# Patient Record
Sex: MALE | Race: WHITE
[De-identification: names, ages, dates, MRNs, and addresses within clinical notes are randomized per-mention and may not be internally consistent; named-entity substitution may affect disease eponyms.]

---

## 2017-05-09 ENCOUNTER — APPOINTMENT (OUTPATIENT)
Dept: PULMONOLOGY | Facility: CLINIC | Age: 67
End: 2017-05-09

## 2017-05-09 VITALS
HEIGHT: 70 IN | BODY MASS INDEX: 35.07 KG/M2 | SYSTOLIC BLOOD PRESSURE: 128 MMHG | HEART RATE: 73 BPM | WEIGHT: 245 LBS | DIASTOLIC BLOOD PRESSURE: 80 MMHG | OXYGEN SATURATION: 96 %

## 2017-05-09 DIAGNOSIS — Z87.891 PERSONAL HISTORY OF NICOTINE DEPENDENCE: ICD-10-CM

## 2017-05-09 DIAGNOSIS — Z87.19 PERSONAL HISTORY OF OTHER DISEASES OF THE DIGESTIVE SYSTEM: ICD-10-CM

## 2017-05-09 DIAGNOSIS — Z78.9 OTHER SPECIFIED HEALTH STATUS: ICD-10-CM

## 2017-05-09 DIAGNOSIS — Z86.39 PERSONAL HISTORY OF OTHER ENDOCRINE, NUTRITIONAL AND METABOLIC DISEASE: ICD-10-CM

## 2017-05-09 DIAGNOSIS — E11.9 TYPE 2 DIABETES MELLITUS W/OUT COMPLICATIONS: ICD-10-CM

## 2017-05-09 DIAGNOSIS — Z82.49 FAMILY HISTORY OF ISCHEMIC HEART DISEASE AND OTHER DISEASES OF THE CIRCULATORY SYSTEM: ICD-10-CM

## 2017-05-09 DIAGNOSIS — Z80.3 FAMILY HISTORY OF MALIGNANT NEOPLASM OF BREAST: ICD-10-CM

## 2017-05-09 DIAGNOSIS — Z91.89 OTHER SPECIFIED PERSONAL RISK FACTORS, NOT ELSEWHERE CLASSIFIED: ICD-10-CM

## 2017-05-09 RX ORDER — ATORVASTATIN CALCIUM 10 MG/1
10 TABLET, FILM COATED ORAL
Refills: 0 | Status: ACTIVE | COMMUNITY

## 2017-05-09 RX ORDER — METFORMIN HYDROCHLORIDE 500 MG/1
500 TABLET, COATED ORAL
Refills: 0 | Status: ACTIVE | COMMUNITY

## 2017-05-09 RX ORDER — GLIPIZIDE 5 MG/1
5 TABLET, FILM COATED, EXTENDED RELEASE ORAL
Refills: 0 | Status: ACTIVE | COMMUNITY

## 2017-05-31 ENCOUNTER — APPOINTMENT (OUTPATIENT)
Dept: PULMONOLOGY | Facility: CLINIC | Age: 67
End: 2017-05-31

## 2017-08-02 ENCOUNTER — APPOINTMENT (OUTPATIENT)
Dept: PULMONOLOGY | Facility: CLINIC | Age: 67
End: 2017-08-02
Payer: COMMERCIAL

## 2017-08-02 PROCEDURE — 94070 EVALUATION OF WHEEZING: CPT

## 2017-08-02 PROCEDURE — 95070 INHLJ BRNCL CHALLENGE TSTG: CPT

## 2017-08-03 RX ORDER — ALBUTEROL SULFATE 90 UG/1
108 (90 BASE) AEROSOL, METERED RESPIRATORY (INHALATION)
Qty: 1 | Refills: 3 | Status: ACTIVE | COMMUNITY
Start: 2017-08-03 | End: 1900-01-01

## 2017-08-22 ENCOUNTER — APPOINTMENT (OUTPATIENT)
Dept: PULMONOLOGY | Facility: CLINIC | Age: 67
End: 2017-08-22
Payer: COMMERCIAL

## 2017-08-22 VITALS
RESPIRATION RATE: 16 BRPM | HEART RATE: 67 BPM | DIASTOLIC BLOOD PRESSURE: 70 MMHG | BODY MASS INDEX: 35.07 KG/M2 | OXYGEN SATURATION: 92 % | WEIGHT: 245 LBS | SYSTOLIC BLOOD PRESSURE: 135 MMHG | HEIGHT: 70 IN

## 2017-08-22 PROCEDURE — 99214 OFFICE O/P EST MOD 30 MIN: CPT | Mod: 25

## 2017-08-22 PROCEDURE — 94010 BREATHING CAPACITY TEST: CPT

## 2017-08-22 PROCEDURE — 95012 NITRIC OXIDE EXP GAS DETER: CPT

## 2017-09-07 ENCOUNTER — LABORATORY RESULT (OUTPATIENT)
Age: 67
End: 2017-09-07

## 2017-09-07 LAB
BASOPHILS # BLD AUTO: 0.05 K/UL
BASOPHILS NFR BLD AUTO: 1.1 %
EOSINOPHIL # BLD AUTO: 0.1 K/UL
EOSINOPHIL NFR BLD AUTO: 2.2 %
HCT VFR BLD CALC: 43.5 %
HGB BLD-MCNC: 14.8 G/DL
IGE SER-MCNC: 166 IU/ML
IMM GRANULOCYTES NFR BLD AUTO: 0 %
LYMPHOCYTES # BLD AUTO: 1.87 K/UL
LYMPHOCYTES NFR BLD AUTO: 41.4 %
MAN DIFF?: NORMAL
MCHC RBC-ENTMCNC: 31.2 PG
MCHC RBC-ENTMCNC: 34 GM/DL
MCV RBC AUTO: 91.8 FL
MONOCYTES # BLD AUTO: 0.32 K/UL
MONOCYTES NFR BLD AUTO: 7.1 %
NEUTROPHILS # BLD AUTO: 2.18 K/UL
NEUTROPHILS NFR BLD AUTO: 48.2 %
PLATELET # BLD AUTO: 152 K/UL
RBC # BLD: 4.74 M/UL
RBC # FLD: 13.9 %
WBC # FLD AUTO: 4.52 K/UL

## 2017-09-08 LAB
24R-OH-CALCIDIOL SERPL-MCNC: 46.1 PG/ML
25(OH)D3 SERPL-MCNC: 31.6 NG/ML

## 2017-09-09 LAB
A ALTERNATA IGE QN: <0.1 KUA/L
A FUMIGATUS IGE QN: <0.1 KUA/L
C ALBICANS IGE QN: 0.28 KUA/L
C HERBARUM IGE QN: <0.1 KUA/L
CAT DANDER IGE QN: 0.45 KUA/L
CLAM IGE QN: <0.1 KUA/L
CODFISH IGE QN: <0.1 KUA/L
COMMON RAGWEED IGE QN: <0.1 KUA/L
CORN IGE QN: <0.1 KUA/L
COW MILK IGE QN: <0.1 KUA/L
D FARINAE IGE QN: <0.1 KUA/L
D PTERONYSS IGE QN: <0.1 KUA/L
DEPRECATED A ALTERNATA IGE RAST QL: 0
DEPRECATED A FUMIGATUS IGE RAST QL: 0
DEPRECATED C ALBICANS IGE RAST QL: NORMAL
DEPRECATED C HERBARUM IGE RAST QL: 0
DEPRECATED CAT DANDER IGE RAST QL: ABNORMAL
DEPRECATED CLAM IGE RAST QL: 0
DEPRECATED CODFISH IGE RAST QL: 0
DEPRECATED COMMON RAGWEED IGE RAST QL: 0
DEPRECATED CORN IGE RAST QL: 0
DEPRECATED COW MILK IGE RAST QL: 0
DEPRECATED D FARINAE IGE RAST QL: 0
DEPRECATED D PTERONYSS IGE RAST QL: 0
DEPRECATED DOG DANDER IGE RAST QL: 0
DEPRECATED EGG WHITE IGE RAST QL: 0
DEPRECATED M RACEMOSUS IGE RAST QL: 0
DEPRECATED PEANUT IGE RAST QL: 0
DEPRECATED ROACH IGE RAST QL: NORMAL
DEPRECATED SCALLOP IGE RAST QL: <0.1 KUA/L
DEPRECATED SESAME SEED IGE RAST QL: 0
DEPRECATED SHRIMP IGE RAST QL: NORMAL
DEPRECATED SOYBEAN IGE RAST QL: 0
DEPRECATED TIMOTHY IGE RAST QL: ABNORMAL
DEPRECATED WALNUT IGE RAST QL: 0
DEPRECATED WHEAT IGE RAST QL: 0
DEPRECATED WHITE OAK IGE RAST QL: 0
DOG DANDER IGE QN: <0.1 KUA/L
EGG WHITE IGE QN: <0.1 KUA/L
IGG SUBSET TOTAL IGG: 853 MG/DL
IGG1 SER-MCNC: 514 MG/DL
IGG2 SER-MCNC: 300 MG/DL
IGG3 SER-MCNC: 19 MG/DL
IGG4 SER-MCNC: 84.5 MG/DL
M RACEMOSUS IGE QN: <0.1 KUA/L
PEANUT IGE QN: <0.1 KUA/L
ROACH IGE QN: 0.11 KUA/L
SCALLOP IGE QN: 0
SCALLOP IGE QN: 0.16 KUA/L
SESAME SEED IGE QN: <0.1 KUA/L
SOYBEAN IGE QN: <0.1 KUA/L
TIMOTHY IGE QN: 6.77 KUA/L
WALNUT IGE QN: <0.1 KUA/L
WHEAT IGE QN: <0.1 KUA/L
WHITE OAK IGE QN: <0.1 KUA/L

## 2017-09-11 LAB
TESTOST BND SERPL-MCNC: 5.1 PG/ML
TESTOST SERPL-MCNC: 360.1 NG/DL

## 2018-01-22 ENCOUNTER — RX RENEWAL (OUTPATIENT)
Age: 68
End: 2018-01-22

## 2018-03-22 ENCOUNTER — APPOINTMENT (OUTPATIENT)
Dept: PULMONOLOGY | Facility: CLINIC | Age: 68
End: 2018-03-22
Payer: COMMERCIAL

## 2018-03-22 VITALS
HEIGHT: 70 IN | WEIGHT: 245 LBS | BODY MASS INDEX: 35.07 KG/M2 | SYSTOLIC BLOOD PRESSURE: 110 MMHG | RESPIRATION RATE: 17 BRPM | HEART RATE: 79 BPM | OXYGEN SATURATION: 96 % | DIASTOLIC BLOOD PRESSURE: 70 MMHG

## 2018-03-22 PROCEDURE — 95012 NITRIC OXIDE EXP GAS DETER: CPT

## 2018-03-22 PROCEDURE — 99214 OFFICE O/P EST MOD 30 MIN: CPT | Mod: 25

## 2018-03-22 PROCEDURE — 94010 BREATHING CAPACITY TEST: CPT

## 2018-03-25 ENCOUNTER — RX RENEWAL (OUTPATIENT)
Age: 68
End: 2018-03-25

## 2018-03-26 ENCOUNTER — RX RENEWAL (OUTPATIENT)
Age: 68
End: 2018-03-26

## 2018-05-03 ENCOUNTER — RX RENEWAL (OUTPATIENT)
Age: 68
End: 2018-05-03

## 2018-05-17 ENCOUNTER — RX RENEWAL (OUTPATIENT)
Age: 68
End: 2018-05-17

## 2018-07-30 ENCOUNTER — APPOINTMENT (OUTPATIENT)
Dept: PULMONOLOGY | Facility: CLINIC | Age: 68
End: 2018-07-30

## 2018-08-02 ENCOUNTER — RX RENEWAL (OUTPATIENT)
Age: 68
End: 2018-08-02

## 2018-09-11 ENCOUNTER — MEDICATION RENEWAL (OUTPATIENT)
Age: 68
End: 2018-09-11

## 2018-10-16 ENCOUNTER — APPOINTMENT (OUTPATIENT)
Dept: PULMONOLOGY | Facility: CLINIC | Age: 68
End: 2018-10-16
Payer: COMMERCIAL

## 2018-10-16 ENCOUNTER — NON-APPOINTMENT (OUTPATIENT)
Age: 68
End: 2018-10-16

## 2018-10-16 VITALS
OXYGEN SATURATION: 95 % | BODY MASS INDEX: 33.88 KG/M2 | DIASTOLIC BLOOD PRESSURE: 70 MMHG | HEART RATE: 87 BPM | HEIGHT: 71 IN | SYSTOLIC BLOOD PRESSURE: 120 MMHG | WEIGHT: 242 LBS

## 2018-10-16 PROCEDURE — 95012 NITRIC OXIDE EXP GAS DETER: CPT

## 2018-10-16 PROCEDURE — 99214 OFFICE O/P EST MOD 30 MIN: CPT | Mod: 25

## 2018-10-16 PROCEDURE — 94010 BREATHING CAPACITY TEST: CPT

## 2018-10-16 NOTE — HISTORY OF PRESENT ILLNESS
[FreeTextEntry1] : Mr. Lemons is a 68 year old male presenting today for a follow up visit for asthma, chronic cough, AISSATOU, and PND. His chief complaint is dysphonia\par - He comes in stating that he feels fine\par - He has been ambulating and biking (indoor and outdoor). He denies any limitations\par - He is using his CPAP machine regularly and tolerating it well.\par - He notes that he takes his CPAP machine off during the night without noticing. He uses it for around 4 hours. \par - He constantly feels that he needs to clear his throat. \par - He denies any headaches, nausea, vomiting, fever, chills, sweats, chest pain, chest pressure, palpitations, SOB, coughing, wheezing, fatigue, diarrhea, constipation, dysphagia, myalgias, dizziness, leg swelling, leg pain, itchy eyes, itchy ears, heartburn, reflux, or sour taste in the mouth.

## 2018-10-16 NOTE — PROCEDURE
[FreeTextEntry1] : PFT- spi reveals normal flows; FEV1 was 3.16L which is 93% of predicted; normal flow volume loop \par \par FENO was 24; a normal value being less than 25\par Fractional exhaled nitric oxide (FENO) is regarded as a simple, noninvasive method for assessing eosinophilic airway inflammation. Produced by a variety of cells within the lung, nitric oxide (NO) concentrations are generally low in healthy individuals. However, high concentrations of NO appear to be involved in nonspecific host defense mechanisms and chronic inflammatory diseases such as asthma. The American Thoracic Society (ATS) therefore has recommended using FENO to aid in the diagnosis and monitoring of eosinophilic airway inflammation and asthma, and for identifying steroid responsive individuals whose chronic respiratory symptoms may be caused by airway inflammation. \par \par His most recent blood work revealed: \par Hemoglobin A1c-  6.4 (elevated)\par HDL- 48\par PSA- 1.1\par Vitamin D- normal \par [facility] blood work normal \par Eosinophil levels- normal\par CRP 5.29 (elevated)

## 2018-10-16 NOTE — ADDENDUM
[FreeTextEntry1] : Documented by Armando San acting as a scribe for Dr. Miguel Angel Peters on 10/16/18\par \par All medical record entries made by the Scribe were at my, Dr. Miguel Angel Peters's, direction and personally dictated by me on 10/16/18. I have reviewed the chart and agree that the record accurately reflects my personal performance of the history, physical exam, assessment and plan. I have also personally directed, reviewed, and agree with the discharge instructions. \par \par \par \par \par

## 2018-10-16 NOTE — ASSESSMENT
[FreeTextEntry1] : Mr. Lemons is a 67 year old male with a history of HTN, diabetes, HLD, sleep apnea, asthma, allergy, LPR, obesity and chronic cough coming in for pulmonary re-evaluation.\par \par The most likely diagnosis for his chronic cough is believed to be seasonal asthma with contribution from season allergies and post nasal drip with secondary reflux.\par \par problem 1: moderate asthma\par - + MCT \par -Breo 200 1 puff QD \par -Ventolin 2 puffs q6H PRN \par -Singulair 10 mg QHS \par -Asthma is believed to be caused by inherited (genetic) and environmental factor, but its exact cause is unknown. Asthma may be triggered by allergens, lung infections, or irritants in the air. Asthma triggers are different for each person\par \par problem 2: upper airway cough syndrome/post nasal drip syndrome- seasonal allergies \par -he is being recommended to use nasal saline\par -seasonal sensation- Clarinex 5 mg QHS \par \par -Environmental measures for allergies were encouraged including mattress and pillow cover, air purifier, and environmental controls.\par \par problem 3: LPR/GERD\par -continue to use omeprazole 40 mg before breakfast\par \par -Rule of 2s: avoid eating too much, eating too late, eating too spicy, eating two hours before bed\par -Things to avoid including overeating, spicy foods, tight clothing, eating within three hours of bed, this list is not all inclusive. \par -For treatment of reflux, possible options discussed including diet control, H2 blockers, PPIs, as well as coating motility agents discussed as treatment options. Timing of meals and proximity of last meal to sleep were discussed. If symptoms persist, a formal gastrointestinal evaluation is needed.\par \par problem 4: r/o biological use?\par - He had blood work, the results of which were as follows: IgE level (+), eosinophil level (-), and vitamin D level (LOW) - elevated IgE level and low normal vitamin D level\par \par problem 5: obesity \par -recommended to have a sneaker evaluation by the [employer] \par -Weight loss, exercise, and diet control were discussed and are highly encouraged. Treatment options were given such as, aqua therapy, and contacting a nutritionist. Recommended to use the elliptical, stationary bike, less use of treadmill. Obesity is associated with worsening asthma, shortness of breath, and potential for cardiac disease, diabetes, and other underlying medical conditions.\par \par problem 6: AISSATOU\par -he is using CPAP device at this time as per Dr. Aggarwal \par -he is being recommended to research the Hypoglossal Nerve Stimulator- Inspire Device, oral appliance, or ProVent\par -he was given a prescription for ProVent in the interim \par \par -Sleep apnea is associated with adverse clinical consequences which an affect most organ systems. Cardiovascular disease risk includes arrhythmias, atrial fibrillation, hypertension, coronary artery disease, and stroke. Metabolic disorders include diabetes type 2, non-alcoholic fatty liver disease. Mood disorder especially depression; and cognitive decline especially in the elderly. Associations with chronic reflux/Webb’s esophagus some but not all inclusive. \par -Reasons to assess this include arousal consistent with hypopnea; respiratory events most prominent in REM sleep or supine position; therefore sleep staging and body position are important for accurate diagnosis and estimation of AHI. \par -Treatment options discussed including CPAP/BiPAP machine, oral appliance, ProVent therapy, Oxy-Aid by [employer], new technologies, or positional sleep.Recommended use of the CPAP machine for moderate (AHI >15), moderate to severe (AHI 15-30) and severe patients (AHI > 30). Recommended weight loss which can reduce AHI especially in weight loss of greater than 5% of BMI. Positional sleep is recommended in those with low AHI, low-moderate MBI, and younger age. For severe sleep apnea, the hypoglossal nerve stimulator was recommended as well. 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 Xolair is produced by a Chinese hamster ovary cell suspension culture in nutrient medium containing the antibiotic gentamicin. Gentamicin is not detectable in the final product. Xolair is a sterile, white, preservative free, lyophilized powder contained in a single use vial that is reconstituted with sterile water for suspension. Side effects include: wheezing, tightness of the chest, trouble breathing, hives, skin rash, feeling anxious or light-headed, fainting, warmth or tingling under skin, or swelling of face, lips, or tongue \par \par problem 8: low vitamin D level\par -low normal vitamin D \par -Has been associated with asthma exacerbations and increased allergic symptoms. The goal based on recent information is maintaining levels between 50-70 and low normal is 30. Recommended 50,000 units every two weeks to once a month depending on the level. 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

## 2018-10-16 NOTE — PHYSICAL EXAM
[General Appearance - Well Developed] : well developed [Normal Appearance] : normal appearance [Well Groomed] : well groomed [General Appearance - Well Nourished] : well nourished [No Deformities] : no deformities [General Appearance - In No Acute Distress] : no acute distress [Normal Conjunctiva] : the conjunctiva exhibited no abnormalities [Eyelids - No Xanthelasma] : the eyelids demonstrated no xanthelasmas [Normal Oropharynx] : normal oropharynx [III] : III [Neck Appearance] : the appearance of the neck was normal [Neck Cervical Mass (___cm)] : no neck mass was observed [Jugular Venous Distention Increased] : there was no jugular-venous distention [Thyroid Diffuse Enlargement] : the thyroid was not enlarged [Thyroid Nodule] : there were no palpable thyroid nodules [Heart Rate And Rhythm] : heart rate and rhythm were normal [Heart Sounds] : normal S1 and S2 [Murmurs] : no murmurs present [Respiration, Rhythm And Depth] : normal respiratory rhythm and effort [Exaggerated Use Of Accessory Muscles For Inspiration] : no accessory muscle use [Auscultation Breath Sounds / Voice Sounds] : lungs were clear to auscultation bilaterally [Abdomen Soft] : soft [Abdomen Tenderness] : non-tender [Abdomen Mass (___ Cm)] : no abdominal mass palpated [Abnormal Walk] : normal gait [Gait - Sufficient For Exercise Testing] : the gait was sufficient for exercise testing [Nail Clubbing] : no clubbing of the fingernails [Cyanosis, Localized] : no localized cyanosis [Petechial Hemorrhages (___cm)] : no petechial hemorrhages [Skin Color & Pigmentation] : normal skin color and pigmentation [] : no rash [No Venous Stasis] : no venous stasis [Skin Lesions] : no skin lesions [No Skin Ulcers] : no skin ulcer [No Xanthoma] : no  xanthoma was observed [Deep Tendon Reflexes (DTR)] : deep tendon reflexes were 2+ and symmetric [Sensation] : the sensory exam was normal to light touch and pinprick [No Focal Deficits] : no focal deficits [Oriented To Time, Place, And Person] : oriented to person, place, and time [Impaired Insight] : insight and judgment were intact [Affect] : the affect was normal [FreeTextEntry1] : i:e of 1:3; clear  [FreeTextEntry2] : 1+ LE edema

## 2018-10-27 ENCOUNTER — RX RENEWAL (OUTPATIENT)
Age: 68
End: 2018-10-27

## 2018-11-08 ENCOUNTER — RX RENEWAL (OUTPATIENT)
Age: 68
End: 2018-11-08

## 2018-12-02 ENCOUNTER — RX RENEWAL (OUTPATIENT)
Age: 68
End: 2018-12-02

## 2019-03-28 ENCOUNTER — RX RENEWAL (OUTPATIENT)
Age: 69
End: 2019-03-28

## 2019-03-29 ENCOUNTER — RX RENEWAL (OUTPATIENT)
Age: 69
End: 2019-03-29

## 2019-04-16 ENCOUNTER — NON-APPOINTMENT (OUTPATIENT)
Age: 69
End: 2019-04-16

## 2019-04-16 ENCOUNTER — APPOINTMENT (OUTPATIENT)
Dept: PULMONOLOGY | Facility: CLINIC | Age: 69
End: 2019-04-16
Payer: COMMERCIAL

## 2019-04-16 VITALS
WEIGHT: 242 LBS | OXYGEN SATURATION: 97 % | RESPIRATION RATE: 17 BRPM | HEART RATE: 73 BPM | HEIGHT: 70 IN | DIASTOLIC BLOOD PRESSURE: 80 MMHG | BODY MASS INDEX: 34.65 KG/M2 | SYSTOLIC BLOOD PRESSURE: 130 MMHG

## 2019-04-16 PROCEDURE — 95012 NITRIC OXIDE EXP GAS DETER: CPT

## 2019-04-16 PROCEDURE — 94010 BREATHING CAPACITY TEST: CPT

## 2019-04-16 PROCEDURE — 99214 OFFICE O/P EST MOD 30 MIN: CPT | Mod: 25

## 2019-04-16 RX ORDER — TELMISARTAN 80 MG/1
80 TABLET ORAL
Refills: 0 | Status: ACTIVE | COMMUNITY

## 2019-04-16 NOTE — PHYSICAL EXAM
[General Appearance - Well Developed] : well developed [Normal Appearance] : normal appearance [Well Groomed] : well groomed [General Appearance - Well Nourished] : well nourished [No Deformities] : no deformities [General Appearance - In No Acute Distress] : no acute distress [Normal Conjunctiva] : the conjunctiva exhibited no abnormalities [Eyelids - No Xanthelasma] : the eyelids demonstrated no xanthelasmas [Normal Oropharynx] : normal oropharynx [Neck Appearance] : the appearance of the neck was normal [Neck Cervical Mass (___cm)] : no neck mass was observed [Jugular Venous Distention Increased] : there was no jugular-venous distention [Thyroid Diffuse Enlargement] : the thyroid was not enlarged [Thyroid Nodule] : there were no palpable thyroid nodules [Heart Rate And Rhythm] : heart rate and rhythm were normal [Heart Sounds] : normal S1 and S2 [Murmurs] : no murmurs present [Respiration, Rhythm And Depth] : normal respiratory rhythm and effort [Exaggerated Use Of Accessory Muscles For Inspiration] : no accessory muscle use [Auscultation Breath Sounds / Voice Sounds] : lungs were clear to auscultation bilaterally [Abdomen Soft] : soft [Abdomen Tenderness] : non-tender [Abdomen Mass (___ Cm)] : no abdominal mass palpated [Abnormal Walk] : normal gait [Gait - Sufficient For Exercise Testing] : the gait was sufficient for exercise testing [Nail Clubbing] : no clubbing of the fingernails [Cyanosis, Localized] : no localized cyanosis [Petechial Hemorrhages (___cm)] : no petechial hemorrhages [Skin Color & Pigmentation] : normal skin color and pigmentation [] : no rash [No Venous Stasis] : no venous stasis [Skin Lesions] : no skin lesions [No Skin Ulcers] : no skin ulcer [No Xanthoma] : no  xanthoma was observed [Sensation] : the sensory exam was normal to light touch and pinprick [Deep Tendon Reflexes (DTR)] : deep tendon reflexes were 2+ and symmetric [No Focal Deficits] : no focal deficits [Oriented To Time, Place, And Person] : oriented to person, place, and time [Impaired Insight] : insight and judgment were intact [Affect] : the affect was normal [II] : II [FreeTextEntry1] : i:e of 1:3; clear  [FreeTextEntry2] : Trace LE edema

## 2019-04-16 NOTE — ASSESSMENT
[FreeTextEntry1] : Mr. Lemons is a 67 year old male with a history of HTN, diabetes, HLD, sleep apnea, asthma, allergy, LPR, obesity and chronic cough coming in for pulmonary re-evaluation. His number one issue today is OSAS. \par \par The most likely diagnosis for his chronic cough is believed to be seasonal asthma with contribution from season allergies and post nasal drip with secondary reflux.\par \par problem 1: moderate asthma\par - S/p MCT c/w asthma \par -Breo 200 1 puff QD \par -Ventolin 2 puffs q6H PRN \par -Singulair 10 mg QHS \par -Asthma is believed to be caused by inherited (genetic) and environmental factor, but its exact cause is unknown. Asthma may be triggered by allergens, lung infections, or irritants in the air. Asthma triggers are different for each person\par \par problem 2: upper airway cough syndrome/post nasal drip syndrome- seasonal allergies \par -he is being recommended to use nasal saline\par -seasonal sensation- Clarinex 5 mg QHS \par \par -Environmental measures for allergies were encouraged including mattress and pillow cover, air purifier, and environmental controls.\par \par problem 3: LPR/GERD\par -continue to use omeprazole 40 mg before breakfast\par - Add Zantac 300 mg QHS\par -Rule of 2s: avoid eating too much, eating too late, eating too spicy, eating two hours before bed\par -Things to avoid including overeating, spicy foods, tight clothing, eating within three hours of bed, this list is not all inclusive. \par -For treatment of reflux, possible options discussed including diet control, H2 blockers, PPIs, as well as coating motility agents discussed as treatment options. Timing of meals and proximity of last meal to sleep were discussed. If symptoms persist, a formal gastrointestinal evaluation is needed.\par \par problem 4: r/o biological use?\par - He had blood work, the results of which were as follows: IgE level (+), eosinophil level (-), and vitamin D level (LOW) - elevated IgE level and low normal vitamin D level\par \par problem 5: obesity \par -recommended to have a sneaker evaluation by the [employer] \par -Weight loss, exercise, and diet control were discussed and are highly encouraged. Treatment options were given such as, aqua therapy, and contacting a nutritionist. Recommended to use the elliptical, stationary bike, less use of treadmill. Obesity is associated with worsening asthma, shortness of breath, and potential for cardiac disease, diabetes, and other underlying medical conditions.\par \par problem 6: AISSATOU\par - Referred to Dr. Price Carson for dental device \par -he is using CPAP device at this time as per Dr. Aggarwal \par -he is being recommended to research the Hypoglossal Nerve Stimulator- Inspire Device, oral appliance, or ProVent\par -he was given a prescription for ProVent in the interim \par \par -Sleep apnea is associated with adverse clinical consequences which an affect most organ systems. Cardiovascular disease risk includes arrhythmias, atrial fibrillation, hypertension, coronary artery disease, and stroke. Metabolic disorders include diabetes type 2, non-alcoholic fatty liver disease. Mood disorder especially depression; and cognitive decline especially in the elderly. Associations with chronic reflux/Webb’s esophagus some but not all inclusive. \par -Reasons to assess this include arousal consistent with hypopnea; respiratory events most prominent in REM sleep or supine position; therefore sleep staging and body position are important for accurate diagnosis and estimation of AHI. \par -Treatment options discussed including CPAP/BiPAP machine, oral appliance, ProVent therapy, Oxy-Aid by [employer], new technologies, or positional sleep.Recommended use of the CPAP machine for moderate (AHI >15), moderate to severe (AHI 15-30) and severe patients (AHI > 30). Recommended weight loss which can reduce AHI especially in weight loss of greater than 5% of BMI. Positional sleep is recommended in those with low AHI, low-moderate MBI, and younger age. For severe sleep apnea, the hypoglossal nerve stimulator was recommended as well. 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 Xolair is produced by a Chinese hamster ovary cell suspension culture in nutrient medium containing the antibiotic gentamicin. Gentamicin is not detectable in the final product. Xolair is a sterile, white, preservative free, lyophilized powder contained in a single use vial that is reconstituted with sterile water for suspension. Side effects include: wheezing, tightness of the chest, trouble breathing, hives, skin rash, feeling anxious or light-headed, fainting, warmth or tingling under skin, or swelling of face, lips, or tongue \par \par problem 8: low vitamin D level\par -low normal vitamin D \par -Has been associated with asthma exacerbations and increased allergic symptoms. The goal based on recent information is maintaining levels between 50-70 and low normal is 30. Recommended 50,000 units every two weeks to once a month depending on the level. 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

## 2019-04-16 NOTE — ADDENDUM
[FreeTextEntry1] : Documented by Armando San acting as a scribe for Dr. Miguel Angel Peters on 4/16/2019\par \par All medical record entries made by the Scribe were at my, Dr. Miguel Angel Peters's, direction and personally dictated by me on 4/16/2019. I have reviewed the chart and agree that the record accurately reflects my personal performance of the history, physical exam, assessment and plan. I have also personally directed, reviewed, and agree with the discharge instructions. \par \par \par \par \par

## 2019-04-16 NOTE — HISTORY OF PRESENT ILLNESS
[FreeTextEntry1] : Mr. Lemons is a 68 year old male presenting today for a follow up visit for asthma, chronic cough, AISSATOU, and PND. His chief complaint is OSAS\par - He was recently diagnosed with HTN and placed on medication, which he has been tolerating it well\par - He states that he has been having issues with his balance. \par - He is sleeping poorly, which he states is due to his sleep apnea\par - He sleeps for 4 hours before waking up. \par - He goes to work, where he takes a 2 hour nap \par - He uses his CPAP machine, but does not feel that it is working\par - He does note that he has been told that he is no longer snoring\par - He has itchy eyes for which he is taking OTC eye drops\par - he reports hoarseness and constantly clearing his throat. \par - He denies any headaches, nausea, vomiting, fever, chills, sweats, chest pain, chest pressure, palpitations, SOB, coughing, wheezing, diarrhea, constipation, dysphagia, myalgias, dizziness, leg swelling, leg pain, itchy ears, heartburn, reflux, or sour taste in the mouth.

## 2019-04-16 NOTE — PROCEDURE
[FreeTextEntry1] : PFT- spi reveals normal flows; FEV1 was 3.42L which is 105% of predicted; abnormal inspiratory limb\par \par FENO was 20; a normal value being less than 25\par Fractional exhaled nitric oxide (FENO) is regarded as a simple, noninvasive method for assessing eosinophilic airway inflammation. Produced by a variety of cells within the lung, nitric oxide (NO) concentrations are generally low in healthy individuals. However, high concentrations of NO appear to be involved in nonspecific host defense mechanisms and chronic inflammatory diseases such as asthma. The American Thoracic Society (ATS) therefore has recommended using FENO to aid in the diagnosis and monitoring of eosinophilic airway inflammation and asthma, and for identifying steroid responsive individuals whose chronic respiratory symptoms may be caused by airway inflammation.

## 2019-04-26 ENCOUNTER — RX RENEWAL (OUTPATIENT)
Age: 69
End: 2019-04-26

## 2019-09-22 ENCOUNTER — RX RENEWAL (OUTPATIENT)
Age: 69
End: 2019-09-22

## 2019-10-05 ENCOUNTER — RX RENEWAL (OUTPATIENT)
Age: 69
End: 2019-10-05

## 2019-10-15 ENCOUNTER — NON-APPOINTMENT (OUTPATIENT)
Age: 69
End: 2019-10-15

## 2019-10-15 ENCOUNTER — APPOINTMENT (OUTPATIENT)
Dept: PULMONOLOGY | Facility: CLINIC | Age: 69
End: 2019-10-15
Payer: COMMERCIAL

## 2019-10-15 VITALS
WEIGHT: 240 LBS | BODY MASS INDEX: 35.55 KG/M2 | RESPIRATION RATE: 16 BRPM | OXYGEN SATURATION: 96 % | HEIGHT: 69 IN | SYSTOLIC BLOOD PRESSURE: 130 MMHG | HEART RATE: 79 BPM | DIASTOLIC BLOOD PRESSURE: 80 MMHG

## 2019-10-15 PROCEDURE — 99214 OFFICE O/P EST MOD 30 MIN: CPT | Mod: 25

## 2019-10-15 PROCEDURE — 94010 BREATHING CAPACITY TEST: CPT

## 2019-10-15 PROCEDURE — 95012 NITRIC OXIDE EXP GAS DETER: CPT

## 2019-10-15 NOTE — HISTORY OF PRESENT ILLNESS
[FreeTextEntry1] : Mr. Lemons is a 68 year old male presenting today for a follow up visit for asthma, chronic cough, AISSATOU, and PND. His chief complaint is throat \par - He was recently diagnosed with HTN and placed on medication, which he has been tolerating it well \par - He is sleeping poorly, which he states is due to his sleep apnea\par - He sleeps for 4 hours before waking up. \par - He goes to work, where he takes a 2 hour nap \par - He uses his CPAP machine, still causes issues but is tolerable\par -sinuses are fine\par -c/o throat issues\par -constantly clearing throat to speak\par -has been worsening\par -bikes for exercise 1-2x a week\par -weight is stable \par -bowels are fine\par -inclines cause SOB\par - He denies any headaches, nausea, vomiting, fever, chills, sweats, chest pain, chest pressure, palpitations, SOB, coughing, wheezing, diarrhea, constipation, dysphagia, myalgias, dizziness, leg swelling, leg pain, itchy ears, heartburn, reflux, or sour taste in the mouth.

## 2019-10-15 NOTE — ASSESSMENT
[FreeTextEntry1] : Mr. Lemons is a 67 year old male with a history of HTN, diabetes, HLD, sleep apnea, asthma, allergy, LPR, obesity and chronic cough coming in for pulmonary re-evaluation. His number one issue today is dysphonia. \par \par The most likely diagnosis for his chronic cough is believed to be seasonal asthma with contribution from season allergies and post nasal drip with secondary reflux.\par \par problem 1: moderate asthma\par - S/p MCT c/w asthma \par -Breo 200 1 puff QD \par -Ventolin 2 puffs q6H PRN \par -Singulair 10 mg QHS \par -Asthma is believed to be caused by inherited (genetic) and environmental factor, but its exact cause is unknown. Asthma may be triggered by allergens, lung infections, or irritants in the air. Asthma triggers are different for each person\par \par problem 2: upper airway cough syndrome/post nasal drip syndrome- seasonal allergies \par -he is being recommended to use nasal saline\par -seasonal sensation- Clarinex 5 mg QHS \par \par -Environmental measures for allergies were encouraged including mattress and pillow cover, air purifier, and environmental controls.\par \par problem 3: LPR/GERD\par -continue to use omeprazole 40 mg before breakfast\par -add Pepcid Complete 40 mg QHS\par -Rule of 2s: avoid eating too much, eating too late, eating too spicy, eating two hours before bed\par -Things to avoid including overeating, spicy foods, tight clothing, eating within three hours of bed, this list is not all inclusive. \par -For treatment of reflux, possible options discussed including diet control, H2 blockers, PPIs, as well as coating motility agents discussed as treatment options. Timing of meals and proximity of last meal to sleep were discussed. If symptoms persist, a formal gastrointestinal evaluation is needed.\par \par problem 4: r/o biological use?\par - He had blood work, the results of which were as follows: IgE level (+), eosinophil level (-), and vitamin D level (LOW) - elevated IgE level and low normal vitamin D level\par \par problem 5: obesity \par -recommended to have a sneaker evaluation by the [facility] \par -Weight loss, exercise, and diet control were discussed and are highly encouraged. Treatment options were given such as, aqua therapy, and contacting a nutritionist. Recommended to use the elliptical, stationary bike, less use of treadmill. Obesity is associated with worsening asthma, shortness of breath, and potential for cardiac disease, diabetes, and other underlying medical conditions.\par \par problem 6: AISSATOU\par - Referred to Dr. Price Carson for dental device \par -he is using CPAP device at this time as per Dr. Aggarwal \par -he is being recommended to research the Hypoglossal Nerve Stimulator- Inspire Device, oral appliance, or ProVent\par -he was given a prescription for ProVent in the interim \par \par -Sleep apnea is associated with adverse clinical consequences which an affect most organ systems. Cardiovascular disease risk includes arrhythmias, atrial fibrillation, hypertension, coronary artery disease, and stroke. Metabolic disorders include diabetes type 2, non-alcoholic fatty liver disease. Mood disorder especially depression; and cognitive decline especially in the elderly. Associations with chronic reflux/Webb’s esophagus some but not all inclusive. \par -Reasons to assess this include arousal consistent with hypopnea; respiratory events most prominent in REM sleep or supine position; therefore sleep staging and body position are important for accurate diagnosis and estimation of AHI. \par -Treatment options discussed including CPAP/BiPAP machine, oral appliance, ProVent therapy, Oxy-Aid by [employer], new technologies, or positional sleep.Recommended use of the CPAP machine for moderate (AHI >15), moderate to severe (AHI 15-30) and severe patients (AHI > 30). Recommended weight loss which can reduce AHI especially in weight loss of greater than 5% of BMI. Positional sleep is recommended in those with low AHI, low-moderate MBI, and younger age. For severe sleep apnea, the hypoglossal nerve stimulator was recommended as well. 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 Xolair is produced by a Chinese hamster ovary cell suspension culture in nutrient medium containing the antibiotic gentamicin. Gentamicin is not detectable in the final product. Xolair is a sterile, white, preservative free, lyophilized powder contained in a single use vial that is reconstituted with sterile water for suspension. Side effects include: wheezing, tightness of the chest, trouble breathing, hives, skin rash, feeling anxious or light-headed, fainting, warmth or tingling under skin, or swelling of face, lips, or tongue \par \par problem 8: low vitamin D level\par -low normal vitamin D \par -Has been associated with asthma exacerbations and increased allergic symptoms. The goal based on recent information is maintaining levels between 50-70 and low normal is 30. Recommended 50,000 units every two weeks to once a month depending on the level. 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 ENT - Galdino.\par He is encouraged to call with any changes, concerns, or questions.

## 2019-10-15 NOTE — PROCEDURE
[FreeTextEntry1] : PFT - spi reveals mild obstruction restrictive dysfunction; FEV1 is 2.54 which is 82% of predicted, abnormal  volume limb \par \par FENO was 33; a normal value being less than 25\par \par Fractional exhaled nitric oxide (FENO) is regarded as a simple, noninvasive method for assessing eosinophilic airway inflammation. Produced by a variety of cells within the lung, nitric oxide (NO) concentrations are generally low in healthy individuals. However, high concentrations of NO appear to be involved in nonspecific host defense mechanisms and chronic inflammatory diseases such as asthma. The American Thoracic Society (ATS) therefore has recommended using FENO to aid in the diagnosis and monitoring of eosinophilic airway inflammation and asthma, and for identifying steroid responsive individuals whose chronic respiratory symptoms may be caused by airway inflammation. \par

## 2019-10-15 NOTE — ADDENDUM
[FreeTextEntry1] : All medical record entries made by madeline Jain were at Dr. Miguel Angel Peters's, direction and personally dictated by me on 10/15/2018. I have reviewed the chart and agree that the record accurately reflects my personal performance of the history, physical exam, assessment and plan. I have also personally directed, reviewed, and agree with the discharge instructions.

## 2019-10-15 NOTE — PHYSICAL EXAM
[Normal Appearance] : normal appearance [General Appearance - Well Developed] : well developed [General Appearance - Well Nourished] : well nourished [No Deformities] : no deformities [Well Groomed] : well groomed [General Appearance - In No Acute Distress] : no acute distress [Normal Conjunctiva] : the conjunctiva exhibited no abnormalities [Normal Oropharynx] : normal oropharynx [Eyelids - No Xanthelasma] : the eyelids demonstrated no xanthelasmas [II] : II [Neck Appearance] : the appearance of the neck was normal [Neck Cervical Mass (___cm)] : no neck mass was observed [Jugular Venous Distention Increased] : there was no jugular-venous distention [Thyroid Diffuse Enlargement] : the thyroid was not enlarged [Thyroid Nodule] : there were no palpable thyroid nodules [Heart Rate And Rhythm] : heart rate and rhythm were normal [Heart Sounds] : normal S1 and S2 [Murmurs] : no murmurs present [Respiration, Rhythm And Depth] : normal respiratory rhythm and effort [Exaggerated Use Of Accessory Muscles For Inspiration] : no accessory muscle use [Auscultation Breath Sounds / Voice Sounds] : lungs were clear to auscultation bilaterally [Abdomen Soft] : soft [Abdomen Tenderness] : non-tender [Abdomen Mass (___ Cm)] : no abdominal mass palpated [Abnormal Walk] : normal gait [Gait - Sufficient For Exercise Testing] : the gait was sufficient for exercise testing [Nail Clubbing] : no clubbing of the fingernails [Cyanosis, Localized] : no localized cyanosis [Petechial Hemorrhages (___cm)] : no petechial hemorrhages [] : no rash [Skin Color & Pigmentation] : normal skin color and pigmentation [No Venous Stasis] : no venous stasis [Skin Lesions] : no skin lesions [No Skin Ulcers] : no skin ulcer [Deep Tendon Reflexes (DTR)] : deep tendon reflexes were 2+ and symmetric [Sensation] : the sensory exam was normal to light touch and pinprick [No Xanthoma] : no  xanthoma was observed [No Focal Deficits] : no focal deficits [Oriented To Time, Place, And Person] : oriented to person, place, and time [Affect] : the affect was normal [Impaired Insight] : insight and judgment were intact [FreeTextEntry1] : i:e of 1:3; clear  [FreeTextEntry2] : Trace LE edema

## 2020-01-19 ENCOUNTER — RX RENEWAL (OUTPATIENT)
Age: 70
End: 2020-01-19

## 2020-02-18 ENCOUNTER — APPOINTMENT (OUTPATIENT)
Dept: PULMONOLOGY | Facility: CLINIC | Age: 70
End: 2020-02-18
Payer: COMMERCIAL

## 2020-02-18 ENCOUNTER — NON-APPOINTMENT (OUTPATIENT)
Age: 70
End: 2020-02-18

## 2020-02-18 VITALS
HEART RATE: 81 BPM | RESPIRATION RATE: 17 BRPM | SYSTOLIC BLOOD PRESSURE: 123 MMHG | DIASTOLIC BLOOD PRESSURE: 65 MMHG | OXYGEN SATURATION: 97 %

## 2020-02-18 PROCEDURE — 95012 NITRIC OXIDE EXP GAS DETER: CPT

## 2020-02-18 PROCEDURE — 94010 BREATHING CAPACITY TEST: CPT

## 2020-02-18 PROCEDURE — 99214 OFFICE O/P EST MOD 30 MIN: CPT | Mod: 25

## 2020-02-18 NOTE — PHYSICAL EXAM
[Normal Appearance] : normal appearance [General Appearance - Well Developed] : well developed [Well Groomed] : well groomed [General Appearance - Well Nourished] : well nourished [General Appearance - In No Acute Distress] : no acute distress [No Deformities] : no deformities [Eyelids - No Xanthelasma] : the eyelids demonstrated no xanthelasmas [Normal Oropharynx] : normal oropharynx [Normal Conjunctiva] : the conjunctiva exhibited no abnormalities [Neck Appearance] : the appearance of the neck was normal [Neck Cervical Mass (___cm)] : no neck mass was observed [Jugular Venous Distention Increased] : there was no jugular-venous distention [Thyroid Diffuse Enlargement] : the thyroid was not enlarged [Heart Sounds] : normal S1 and S2 [Heart Rate And Rhythm] : heart rate and rhythm were normal [Murmurs] : no murmurs present [Thyroid Nodule] : there were no palpable thyroid nodules [Exaggerated Use Of Accessory Muscles For Inspiration] : no accessory muscle use [Respiration, Rhythm And Depth] : normal respiratory rhythm and effort [Abdomen Soft] : soft [Auscultation Breath Sounds / Voice Sounds] : lungs were clear to auscultation bilaterally [Abdomen Mass (___ Cm)] : no abdominal mass palpated [Abdomen Tenderness] : non-tender [Abnormal Walk] : normal gait [Gait - Sufficient For Exercise Testing] : the gait was sufficient for exercise testing [Nail Clubbing] : no clubbing of the fingernails [Cyanosis, Localized] : no localized cyanosis [Petechial Hemorrhages (___cm)] : no petechial hemorrhages [Skin Color & Pigmentation] : normal skin color and pigmentation [] : no rash [No Venous Stasis] : no venous stasis [Skin Lesions] : no skin lesions [No Xanthoma] : no  xanthoma was observed [Deep Tendon Reflexes (DTR)] : deep tendon reflexes were 2+ and symmetric [No Skin Ulcers] : no skin ulcer [No Focal Deficits] : no focal deficits [Sensation] : the sensory exam was normal to light touch and pinprick [Oriented To Time, Place, And Person] : oriented to person, place, and time [Affect] : the affect was normal [Impaired Insight] : insight and judgment were intact [III] : III [FreeTextEntry1] : t [FreeTextEntry2] : Trace LE edema

## 2020-02-18 NOTE — HISTORY OF PRESENT ILLNESS
[FreeTextEntry1] : Mr. Lemons is a 69 year old male presenting today for a follow up visit for asthma, chronic cough, AISSATOU, and PND. His chief complaint is left ear infection \par - He has not been sleeping well \par - He uses his CPAP properly, about 4-5 hour, his sleep is interrupted \par - He has reflux symptoms, hurts his throat during speech \par - He denies palpitations \par - His energy levels have been okay\par - his weight has been stable, hes trying to lose weight \par - his vision is okay , but his hearing is down \par - He notes that he coughs Dr. Nogueira believes maybe its from the stomach \par - His sinuses have been okay although has ear canal infection \par -he denies any headaches, nausea, vomiting, fever, chills, sweats, , diarrhea, constipation, dysphagia, dizziness, sour taste in the mouth, leg swelling, leg pain, itchy eyes, itchy ears

## 2020-02-18 NOTE — PROCEDURE
[FreeTextEntry1] : PFT revealed normal flows, with a FEV1 of 3.19L, which is 103% of predicted, with a normal flow volume loop\par \par \par FENO was 11 ; a normal value being less than 25\par Fractional exhaled nitric oxide (FENO) is regarded as a simple, noninvasive method for assessing eosinophilic airway inflammation. Produced by a variety of cells within the lung, nitric oxide (NO) concentrations are generally low in healthy individuals. However, high concentrations of NO appear to be involved in nonspecific host defense mechanisms and chronic inflammatory diseases such as asthma. The American Thoracic Society (ATS) therefore has recommended using FENO to aid in the diagnosis and monitoring of eosinophilic airway inflammation and asthma, and for identifying steroid responsive individuals whose chronic respiratory symptoms may be caused by airway inflammation.\par

## 2020-02-18 NOTE — ADDENDUM
[FreeTextEntry1] : Documented bySiomara Lockett acting as a scribe for Dr. Miguel Angel Peters on 02/18/2020.\par \par All medical record entries made by the Scribe were at my, Dr. Miguel Angel Peters's, direction and personally dictated by me on 02/18/2020. I have reviewed the chart and agree that the record accurately reflects my personal performance of the history, physical exam, assessment and plan. I have also personally directed, reviewed, and agree with the discharge instructions.\par

## 2020-02-18 NOTE — ASSESSMENT
[FreeTextEntry1] : Mr. Lemons is a 69 year old male with a history of HTN, diabetes, HLD, sleep apnea, asthma, allergy, LPR, obesity and chronic cough coming in for pulmonary re-evaluation. His number one issue today is dysphonia / left ear infection\par \par The most likely diagnosis for his chronic cough is believed to be seasonal asthma with contribution from season allergies and post nasal drip with secondary reflux.\par \par problem 1: moderate asthma\par - S/p MCT c/w asthma \par -Breo 200 1 puff QD \par -Ventolin 2 puffs q6H PRN \par -Singulair 10 mg QHS \par -Asthma is believed to be caused by inherited (genetic) and environmental factor, but its exact cause is unknown. Asthma may be triggered by allergens, lung infections, or irritants in the air. Asthma triggers are different for each person\par \par problem 2: upper airway cough syndrome/post nasal drip syndrome- seasonal allergies \par -he is being recommended to use nasal saline\par -seasonal sensation- Clarinex 5 mg QHS \par \par -Environmental measures for allergies were encouraged including mattress and pillow cover, air purifier, and environmental controls.\par \par problem 3: LPR/GERD\par - Recommended organic apple cider vinegar tablets \par -continue to use omeprazole 40 mg before breakfast\par -add Pepcid 40 mg QHS\par -Rule of 2s: avoid eating too much, eating too late, eating too spicy, eating two hours before bed\par -Things to avoid including overeating, spicy foods, tight clothing, eating within three hours of bed, this list is not all inclusive. \par -For treatment of reflux, possible options discussed including diet control, H2 blockers, PPIs, as well as coating motility agents discussed as treatment options. Timing of meals and proximity of last meal to sleep were discussed. If symptoms persist, a formal gastrointestinal evaluation is needed.\par \par problem 4: r/o biological use?\par - He had blood work, the results of which were as follows: IgE level (+), eosinophil level (-), and vitamin D level (LOW) - elevated IgE level and low normal vitamin D level\par \par problem 5: obesity \par -recommended to have a sneaker evaluation by the [facility] \par -Weight loss, exercise, and diet control were discussed and are highly encouraged. Treatment options were given such as, aqua therapy, and contacting a nutritionist. Recommended to use the elliptical, stationary bike, less use of treadmill. Obesity is associated with worsening asthma, shortness of breath, and potential for cardiac disease, diabetes, and other underlying medical conditions.\par \par problem 6: AISSATOU - compliant \par - Referred to Dr. Price Carson for dental device \par -he is using CPAP device at this time as per Dr. Aggarwal \par -he is being recommended to research the Hypoglossal Nerve Stimulator- Inspire Device, oral appliance, or ProVent\par -he was given a prescription for ProVent in the interim \par \par -Sleep apnea is associated with adverse clinical consequences which an affect most organ systems. Cardiovascular disease risk includes arrhythmias, atrial fibrillation, hypertension, coronary artery disease, and stroke. Metabolic disorders include diabetes type 2, non-alcoholic fatty liver disease. Mood disorder especially depression; and cognitive decline especially in the elderly. Associations with chronic reflux/Webb’s esophagus some but not all inclusive. \par -Reasons to assess this include arousal consistent with hypopnea; respiratory events most prominent in REM sleep or supine position; therefore sleep staging and body position are important for accurate diagnosis and estimation of AHI. \par -Treatment options discussed including CPAP/BiPAP machine, oral appliance, ProVent therapy, Oxy-Aid by [facility], new technologies, or positional sleep.Recommended use of the CPAP machine for moderate (AHI >15), moderate to severe (AHI 15-30) and severe patients (AHI > 30). Recommended weight loss which can reduce AHI especially in weight loss of greater than 5% of BMI. Positional sleep is recommended in those with low AHI, low-moderate MBI, and younger age. For severe sleep apnea, the hypoglossal nerve stimulator was recommended as well. 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 Xolair is produced by a Chinese hamster ovary cell suspension culture in nutrient medium containing the antibiotic gentamicin. Gentamicin is not detectable in the final product. Xolair is a sterile, white, preservative free, lyophilized powder contained in a single use vial that is reconstituted with sterile water for suspension. Side effects include: wheezing, tightness of the chest, trouble breathing, hives, skin rash, feeling anxious or light-headed, fainting, warmth or tingling under skin, or swelling of face, lips, or tongue \par \par problem 8: low vitamin D level\par -low normal vitamin D \par -Has been associated with asthma exacerbations and increased allergic symptoms. The goal based on recent information is maintaining levels between 50-70 and low normal is 30. Recommended 50,000 units every two weeks to once a month depending on the level. 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 ENT - Doris berry al.\par He is encouraged to call with any changes, concerns, or questions.

## 2020-03-15 ENCOUNTER — RX RENEWAL (OUTPATIENT)
Age: 70
End: 2020-03-15

## 2020-03-17 RX ORDER — ALBUTEROL SULFATE 90 UG/1
108 (90 BASE) AEROSOL, METERED RESPIRATORY (INHALATION) EVERY 6 HOURS
Qty: 1 | Refills: 3 | Status: ACTIVE | COMMUNITY
Start: 2018-10-16 | End: 1900-01-01

## 2020-04-14 RX ORDER — DESLORATADINE 5 MG/1
5 TABLET ORAL
Qty: 90 | Refills: 1 | Status: DISCONTINUED | COMMUNITY
Start: 2018-05-17 | End: 2020-04-14

## 2020-08-29 ENCOUNTER — RX RENEWAL (OUTPATIENT)
Age: 70
End: 2020-08-29

## 2020-08-30 ENCOUNTER — RX RENEWAL (OUTPATIENT)
Age: 70
End: 2020-08-30

## 2020-09-08 ENCOUNTER — APPOINTMENT (OUTPATIENT)
Dept: PULMONOLOGY | Facility: CLINIC | Age: 70
End: 2020-09-08

## 2020-10-06 ENCOUNTER — RX RENEWAL (OUTPATIENT)
Age: 70
End: 2020-10-06

## 2020-10-16 ENCOUNTER — RX RENEWAL (OUTPATIENT)
Age: 70
End: 2020-10-16

## 2020-10-19 ENCOUNTER — APPOINTMENT (OUTPATIENT)
Dept: PULMONOLOGY | Facility: CLINIC | Age: 70
End: 2020-10-19
Payer: COMMERCIAL

## 2020-10-19 PROCEDURE — 99442: CPT

## 2020-10-19 RX ORDER — RANITIDINE 300 MG/1
300 TABLET ORAL
Qty: 90 | Refills: 0 | Status: DISCONTINUED | COMMUNITY
Start: 2019-04-16 | End: 2020-10-19

## 2020-10-19 RX ORDER — DESLORATADINE 5 MG/1
5 TABLET ORAL
Qty: 90 | Refills: 1 | Status: ACTIVE | OUTPATIENT
Start: 2017-08-22

## 2020-10-19 NOTE — ADDENDUM
[FreeTextEntry1] : Documented by Siomara Lockett acting as a scribe for Dr. Miguel Angel Peters on 10/19/2020 \par \par All medical record entries made by the Scribe were at my, Dr. Miguel Angel Peters's, direction and personally dictated by me on 10/19/2020 . I have reviewed the chart and agree that the record accurately reflects my personal performance of the history, physical exam, assessment and plan. I have also personally directed, reviewed, and agree with the discharge instructions.

## 2020-10-19 NOTE — REASON FOR VISIT
[Follow-Up] : a follow-up visit [FreeTextEntry1] : telephonic -asthma, chronic cough, AISSATOU, and PND

## 2020-10-19 NOTE — ASSESSMENT
[FreeTextEntry1] : Mr. Lemons is a 70 year old male with a history of HTN, diabetes, HLD, sleep apnea, asthma, allergy, LPR, obesity and chronic cough spoken to via telephone call for pulmonary re-evaluation. His number one issue today memory\par \par The most likely diagnosis for his chronic cough is believed to be seasonal asthma with contribution from season allergies and post nasal drip with secondary reflux.\par \par problem 1: moderate asthma (stable)\par - S/p MCT c/w asthma \par -Breo 200 1 puff QD \par -Ventolin 2 puffs q6H PRN \par -Singulair 10 mg QHS \par -Asthma is believed to be caused by inherited (genetic) and environmental factor, but its exact cause is unknown. Asthma may be triggered by allergens, lung infections, or irritants in the air. Asthma triggers are different for each person\par \par problem 2: upper airway cough syndrome/post nasal drip syndrome- seasonal allergies \par -he is being recommended to use nasal saline\par -seasonal sensation- Clarinex 5 mg QHS \par \par -Environmental measures for allergies were encouraged including mattress and pillow cover, air purifier, and environmental controls.\par \par problem 3: LPR/GERD\par - Recommended organic apple cider vinegar tablets \par -continue to use omeprazole 40 mg before breakfast\par -continue Pepcid 40 mg QHS\par -Rule of 2s: avoid eating too much, eating too late, eating too spicy, eating two hours before bed\par -Things to avoid including overeating, spicy foods, tight clothing, eating within three hours of bed, this list is not all inclusive. \par -For treatment of reflux, possible options discussed including diet control, H2 blockers, PPIs, as well as coating motility agents discussed as treatment options. Timing of meals and proximity of last meal to sleep were discussed. If symptoms persist, a formal gastrointestinal evaluation is needed.\par \par problem 4: r/o biological use?\par - He had blood work, the results of which were as follows: IgE level (+), eosinophil level (-), and vitamin D level (LOW) - elevated IgE level and low normal vitamin D level\par \par problem 5: obesity (240 lb)\par -recommended to have a sneaker evaluation by the [facility] \par -Weight loss, exercise, and diet control were discussed and are highly encouraged. Treatment options were given such as, aqua therapy, and contacting a nutritionist. Recommended to use the elliptical, stationary bike, less use of treadmill. Obesity is associated with worsening asthma, shortness of breath, and potential for cardiac disease, diabetes, and other underlying medical conditions.\par \par problem 6: AISSATOU - compliant \par - Referred to Dr. Price Carson for dental device \par -he is using CPAP device at this time as per Dr. Aggarwal \par -he is being recommended to research the Hypoglossal Nerve Stimulator- Inspire Device, oral appliance, or ProVent\par -he was given a prescription for ProVent in the interim \par \par -Sleep apnea is associated with adverse clinical consequences which an affect most organ systems. Cardiovascular disease risk includes arrhythmias, atrial fibrillation, hypertension, coronary artery disease, and stroke. Metabolic disorders include diabetes type 2, non-alcoholic fatty liver disease. Mood disorder especially depression; and cognitive decline especially in the elderly. Associations with chronic reflux/Webb’s esophagus some but not all inclusive. \par -Reasons to assess this include arousal consistent with hypopnea; respiratory events most prominent in REM sleep or supine position; therefore sleep staging and body position are important for accurate diagnosis and estimation of AHI. \par -Treatment options discussed including CPAP/BiPAP machine, oral appliance, ProVent therapy, Oxy-Aid by [employer], new technologies, or positional sleep.Recommended use of the CPAP machine for moderate (AHI >15), moderate to severe (AHI 15-30) and severe patients (AHI > 30). Recommended weight loss which can reduce AHI especially in weight loss of greater than 5% of BMI. Positional sleep is recommended in those with low AHI, low-moderate MBI, and younger age. For severe sleep apnea, the hypoglossal nerve stimulator was recommended as well. 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 Xolair is produced by a Chinese hamster ovary cell suspension culture in nutrient medium containing the antibiotic gentamicin. Gentamicin is not detectable in the final product. Xolair is a sterile, white, preservative free, lyophilized powder contained in a single use vial that is reconstituted with sterile water for suspension. Side effects include: wheezing, tightness of the chest, trouble breathing, hives, skin rash, feeling anxious or light-headed, fainting, warmth or tingling under skin, or swelling of face, lips, or tongue \par \par problem 8: low vitamin D level\par -low normal vitamin D \par -Has been associated with asthma exacerbations and increased allergic symptoms. The goal based on recent information is maintaining levels between 50-70 and low normal is 30. Recommended 50,000 units every two weeks to once a month depending on the level. 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 \par \par \par F/U in 4 months with spi. ENT - Doris et al.\par He is encouraged to call with any changes, concerns, or questions.

## 2020-10-19 NOTE — HISTORY OF PRESENT ILLNESS
[FreeTextEntry1] : Mr. Lemons is a 70 year old male spoken to via telephone today for a follow up visit for asthma, chronic cough, AISSATOU, and PND. His chief complaint is \par - he has been feeling fine \par - he notes he was renewing a prescription and the pharmacy wanted him to talk to his doctor first\par - energy levels have been fine \par - he notes he needs some of his prescriptions renewed. \par - he is concerned because lately he has been experiencing some memory loss he feels its due to his medication\par - no fevers, chills, sweats \par - bowel movements are regular \par - has a little bit of difficulty swallowing \par - energy levels have been good\par - he notes he uses his sleep apnea machine to sleep \par - notes he has not gotten a flu shot \par - his voice is normally good but he has a little bit of a lump to clear his throat\par - his reflux has been good \par - he notes his weight has been a bit high \par - he has not been exercising \par - He  denies any visual issues, headaches, nausea, vomiting, fever, chills, sweats, chest pains, chest pressure, diarrhea, constipation, dysphagia, myalgia, dizziness, leg swelling, leg pain, itchy eyes, itchy ears, heartburn, reflux, or sour taste in the mouth.

## 2021-04-04 ENCOUNTER — RX RENEWAL (OUTPATIENT)
Age: 71
End: 2021-04-04

## 2021-04-07 ENCOUNTER — NON-APPOINTMENT (OUTPATIENT)
Age: 71
End: 2021-04-07

## 2021-04-07 ENCOUNTER — APPOINTMENT (OUTPATIENT)
Dept: PULMONOLOGY | Facility: CLINIC | Age: 71
End: 2021-04-07
Payer: COMMERCIAL

## 2021-04-07 VITALS
SYSTOLIC BLOOD PRESSURE: 124 MMHG | HEART RATE: 71 BPM | OXYGEN SATURATION: 98 % | HEIGHT: 69 IN | WEIGHT: 247 LBS | DIASTOLIC BLOOD PRESSURE: 80 MMHG | TEMPERATURE: 97 F | RESPIRATION RATE: 17 BRPM | BODY MASS INDEX: 36.58 KG/M2

## 2021-04-07 PROCEDURE — 99214 OFFICE O/P EST MOD 30 MIN: CPT | Mod: 25

## 2021-04-07 PROCEDURE — 94010 BREATHING CAPACITY TEST: CPT

## 2021-04-07 PROCEDURE — 95012 NITRIC OXIDE EXP GAS DETER: CPT

## 2021-04-07 PROCEDURE — 99072 ADDL SUPL MATRL&STAF TM PHE: CPT

## 2021-04-07 NOTE — ADDENDUM
[FreeTextEntry1] : Documented by Shanna Preciado acting as a scribe for Dr. Miguel Angel Peters on 04/07/2021.\par \par All medical record entries made by the Scribe were at my, Dr. Miguel Angel Peters's, direction and personally dictated by me on 04/07/2021 . I have reviewed the chart and agree that the record accurately reflects my personal performance of the history, physical exam, assessment and plan. I have also personally directed, reviewed, and agree with the discharge instructions. \par

## 2021-04-07 NOTE — ASSESSMENT
[FreeTextEntry1] : Mr. Lemons is a 70 year old male with a history of HTN, diabetes, HLD, sleep apnea, asthma, allergy, LPR, obesity and chronic cough comes in for pulmonary re-evaluation. His number one issue today memory still; -s/p COVID vaccine x 2 Pfizer\par \par The most likely diagnosis for his chronic cough is believed to be seasonal asthma with contribution from season allergies and post nasal drip with secondary reflux.\par \par problem 1: moderate asthma (stable)\par - S/p MCT c/w asthma \par -Breo 200 1 puff QD \par -Ventolin 2 puffs q6H PRN \par -Singulair 10 mg QHS \par -Asthma is believed to be caused by inherited (genetic) and environmental factor, but its exact cause is unknown. Asthma may be triggered by allergens, lung infections, or irritants in the air. Asthma triggers are different for each person\par \par problem 2: upper airway cough syndrome/post nasal drip syndrome- seasonal allergies \par -he is being recommended to use nasal saline\par -seasonal sensation- Clarinex 5 mg QHS \par \par -Environmental measures for allergies were encouraged including mattress and pillow cover, air purifier, and environmental controls.\par \par problem 3: LPR/GERD\par - Recommended organic apple cider vinegar tablets \par -continue to use omeprazole 40 mg before breakfast\par -continue Pepcid 40 mg QHS\par -Rule of 2s: avoid eating too much, eating too late, eating too spicy, eating two hours before bed\par -Things to avoid including overeating, spicy foods, tight clothing, eating within three hours of bed, this list is not all inclusive. \par -For treatment of reflux, possible options discussed including diet control, H2 blockers, PPIs, as well as coating motility agents discussed as treatment options. Timing of meals and proximity of last meal to sleep were discussed. If symptoms persist, a formal gastrointestinal evaluation is needed.\par \par problem 4: r/o biological use?\par - He had blood work, the results of which were as follows: IgE level (+), eosinophil level (-), and vitamin D level (LOW) - elevated IgE level and low normal vitamin D level\par \par problem 5: obesity (240 lb)\par -recommended to have a sneaker evaluation by the [facility] \par -Weight loss, exercise, and diet control were discussed and are highly encouraged. Treatment options were given such as, aqua therapy, and contacting a nutritionist. Recommended to use the elliptical, stationary bike, less use of treadmill. Obesity is associated with worsening asthma, shortness of breath, and potential for cardiac disease, diabetes, and other underlying medical conditions.\par \par problem 6: AISSATOU - compliant \par - Referred to Dr. Price Carson for dental device \par -he is using CPAP device at this time as per Dr. Aggarwal \par -he is being recommended to research the Hypoglossal Nerve Stimulator- Inspire Device, oral appliance, or ProVent\par -he was given a prescription for ProVent in the interim \par \par -Sleep apnea is associated with adverse clinical consequences which an affect most organ systems. Cardiovascular disease risk includes arrhythmias, atrial fibrillation, hypertension, coronary artery disease, and stroke. Metabolic disorders include diabetes type 2, non-alcoholic fatty liver disease. Mood disorder especially depression; and cognitive decline especially in the elderly. Associations with chronic reflux/Webb’s esophagus some but not all inclusive. \par -Reasons to assess this include arousal consistent with hypopnea; respiratory events most prominent in REM sleep or supine position; therefore sleep staging and body position are important for accurate diagnosis and estimation of AHI. \par -Treatment options discussed including CPAP/BiPAP machine, oral appliance, ProVent therapy, Oxy-Aid by [employer], new technologies, or positional sleep.Recommended use of the CPAP machine for moderate (AHI >15), moderate to severe (AHI 15-30) and severe patients (AHI > 30). Recommended weight loss which can reduce AHI especially in weight loss of greater than 5% of BMI. Positional sleep is recommended in those with low AHI, low-moderate MBI, and younger age. For severe sleep apnea, the hypoglossal nerve stimulator was recommended as well. 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 Xolair is produced by a Chinese hamster ovary cell suspension culture in nutrient medium containing the antibiotic gentamicin. Gentamicin is not detectable in the final product. Xolair is a sterile, white, preservative free, lyophilized powder contained in a single use vial that is reconstituted with sterile water for suspension. Side effects include: wheezing, tightness of the chest, trouble breathing, hives, skin rash, feeling anxious or light-headed, fainting, warmth or tingling under skin, or swelling of face, lips, or tongue \par \par problem 8: low vitamin D level\par -low normal vitamin D \par -Has been associated with asthma exacerbations and increased allergic symptoms. The goal based on recent information is maintaining levels between 50-70 and low normal is 30. Recommended 50,000 units every two weeks to once a month depending on the level. 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 \par \par \par F/U in 4 months with spi. ENT - Doris et al.\par He is encouraged to call with any changes, concerns, or questions.

## 2021-04-07 NOTE — HISTORY OF PRESENT ILLNESS
[FreeTextEntry1] : Mr. Lemons is a 70 year old male comes in today for a follow up visit for asthma, chronic cough, AISSATOU, and PND. His chief complaint is \par \par -s/p COVID vaccine x 2 Pfizer with no residual reaction\par -pt denies chest pain/pressure\par -pt denies diarrhea/constipation\par -pt reports occasional dizziness\par -pt notes occasional dysphagia\par -pt notes poor sleep pattern; sleeps with ventilator; 4-5 hours nightly\par -pt denies leg/ankle swelling\par -pt denies visual issues\par -pt notes hoarseness\par -pt notes exercise; looking to improve; walking\par -pt denies leaky,drippy sinus\par -pt reports stable weight\par -he denies taking any new medications, vitamins, or supplements\par -pt report occasional cough\par -pt denies wheezing\par -pt notes frequency to clear throat\par -he notes his memory and concentration are not as good\par \par -denies any chest pain, chest pressure, diarrhea, constipation, sour taste in the mouth, leg swelling, leg pain, myalgias, arthralgias, itchy eyes, itchy ears, heartburn, or reflux.\par \par

## 2021-04-07 NOTE — PROCEDURE
[FreeTextEntry1] : PFT revealed normal flows, with a FEV1 of 3.24L, which is 106 % of predicted, with a flattened inspiratory limb \par \par FENO was 18 ; a normal value being less than 25\par Fractional exhaled nitric oxide (FENO) is regarded as a simple, noninvasive method for assessing eosinophilic airway inflammation. Produced by a variety of cells within the lung, nitric oxide (NO) concentrations are generally low in healthy individuals. However, high concentrations of NO appear to be involved in nonspecific host defense mechanisms and chronic inflammatory diseases such as asthma. The American Thor[employer] Society (ATS) therefore has recommended using FENO to aid in the diagnosis and monitoring of eosinophilic airway inflammation and asthma, and for identifying steroid responsive individuals whose chronic respiratory symptoms may be caused by airway inflammation.

## 2021-04-08 ENCOUNTER — APPOINTMENT (OUTPATIENT)
Dept: UROLOGY | Facility: CLINIC | Age: 71
End: 2021-04-08

## 2021-04-09 ENCOUNTER — APPOINTMENT (OUTPATIENT)
Dept: UROLOGY | Facility: CLINIC | Age: 71
End: 2021-04-09
Payer: COMMERCIAL

## 2021-04-09 DIAGNOSIS — R39.15 URGENCY OF URINATION: ICD-10-CM

## 2021-04-09 DIAGNOSIS — N40.1 BENIGN PROSTATIC HYPERPLASIA WITH LOWER URINARY TRACT SYMPMS: ICD-10-CM

## 2021-04-09 PROCEDURE — 51798 US URINE CAPACITY MEASURE: CPT

## 2021-04-09 PROCEDURE — 99072 ADDL SUPL MATRL&STAF TM PHE: CPT

## 2021-04-09 PROCEDURE — 99203 OFFICE O/P NEW LOW 30 MIN: CPT

## 2021-04-11 PROBLEM — N40.1 HYPERPLASIA OF PROSTATE WITH LOWER URINARY TRACT SYMPTOMS (LUTS): Status: ACTIVE | Noted: 2021-04-11

## 2021-04-11 PROBLEM — R39.15 URINARY URGENCY: Status: ACTIVE | Noted: 2021-04-11

## 2021-04-11 NOTE — HISTORY OF PRESENT ILLNESS
[FreeTextEntry1] : patient here for evaluation of some urinary issues.\par He some increased urgency but no leakage associated with mildly increased frequency and nocturia 0-1. The FOS is ok with no straining intermittency though can double void to empty. No dysuria or hematuria. No prior UTIs or retention. \par PSA 2018 1.1\par PVR 93

## 2021-04-11 NOTE — ASSESSMENT
[FreeTextEntry1] : discussed therapy for LUTs in the absence of an associated medical issue: noted that urgency treatable with placebo.\par he is not looking for medication.\par also discussed getting PSA - last was low and no clear evidence of screening past 70 - will hold off.

## 2021-04-11 NOTE — PHYSICAL EXAM
[General Appearance - Well Developed] : well developed [General Appearance - Well Nourished] : well nourished [Edema] : no peripheral edema [Abdomen Soft] : soft [Abdomen Tenderness] : non-tender [Abdomen Hernia] : no hernia was discovered [Abdomen Mass (___ Cm)] : no abdominal mass palpated [Size ___ (gms)] : size was estimated to be [unfilled] g [Prostate Hard Area Or Nodule Bilaterally] : had no palpable nodules [Rectal Exam - Prostate] : was not indurated [Nl Inspection] : the anus was normal on inspection. [Nl Sphincter Tone] : normal sphincter tone [No Lesions] : no lesions [Phimosis] : no phimosis [Balanitis] : no balanitis [Circumcised] : the penis was uncircumcised [Normal] : normal [Scrotum Hydrocele On The Right] : no hydrocele [Scrotum Hydrocele On The Left] : no hydrocele [Testes] : normal [Epididymis] : was normal [Vas Deferens / Spermatic Cord] : was normal [Normal Station and Gait] : the gait and station were normal for the patient's age [No Focal Deficits] : no focal deficits [] : no rash [Oriented To Time, Place, And Person] : oriented to person, place, and time [Inguinal Lymph Nodes Enlarged Bilaterally] : inguinal

## 2021-04-20 ENCOUNTER — APPOINTMENT (OUTPATIENT)
Dept: UROLOGY | Facility: CLINIC | Age: 71
End: 2021-04-20

## 2021-06-27 ENCOUNTER — RX RENEWAL (OUTPATIENT)
Age: 71
End: 2021-06-27

## 2021-10-18 ENCOUNTER — TRANSCRIPTION ENCOUNTER (OUTPATIENT)
Age: 71
End: 2021-10-18

## 2021-11-09 ENCOUNTER — APPOINTMENT (OUTPATIENT)
Dept: PULMONOLOGY | Facility: CLINIC | Age: 71
End: 2021-11-09
Payer: COMMERCIAL

## 2021-11-09 VITALS
RESPIRATION RATE: 17 BRPM | HEIGHT: 69 IN | OXYGEN SATURATION: 98 % | TEMPERATURE: 97.1 F | SYSTOLIC BLOOD PRESSURE: 130 MMHG | BODY MASS INDEX: 36.29 KG/M2 | DIASTOLIC BLOOD PRESSURE: 70 MMHG | HEART RATE: 64 BPM | WEIGHT: 245 LBS

## 2021-11-09 PROCEDURE — 99214 OFFICE O/P EST MOD 30 MIN: CPT

## 2021-11-09 RX ORDER — OMEPRAZOLE 40 MG/1
40 CAPSULE, DELAYED RELEASE ORAL
Qty: 1 | Refills: 1 | Status: ACTIVE | COMMUNITY

## 2021-11-09 NOTE — PHYSICAL EXAM
[No Acute Distress] : no acute distress [Normal Oropharynx] : normal oropharynx [III] : Mallampati Class: III [Normal Appearance] : normal appearance [No Neck Mass] : no neck mass [Normal Rate/Rhythm] : normal rate/rhythm [Normal S1, S2] : normal s1, s2 [No Murmurs] : no murmurs [No Resp Distress] : no resp distress [Clear to Auscultation Bilaterally] : clear to auscultation bilaterally [No Abnormalities] : no abnormalities [Benign] : benign [Normal Gait] : normal gait [No Clubbing] : no clubbing [No Cyanosis] : no cyanosis [FROM] : FROM [Normal Color/ Pigmentation] : normal color/ pigmentation [No Focal Deficits] : no focal deficits [Oriented x3] : oriented x3 [Normal Affect] : normal affect [TextBox_2] : OW  [TextBox_68] : I:E ratio 1:3; clear  [TextBox_105] : 2+ LE edema

## 2021-11-09 NOTE — ASSESSMENT
[FreeTextEntry1] : Mr. Lemons is a 71 year old male with a history of HTN, diabetes, HLD, sleep apnea, asthma, allergy, LPR, obesity and chronic cough comes in for pulmonary re-evaluation. His number one issue today memory still; chronic cough\par \par The most likely diagnosis for his chronic cough is believed to be seasonal asthma with contribution from season allergies and post nasal drip with secondary reflux.\par \par problem 1: moderate asthma (stable)\par - S/p MCT c/w asthma \par -Breo 200 1 puff QD \par -Ventolin 2 puffs q6H PRN \par -Singulair 10 mg QHS \par -Asthma is believed to be caused by inherited (genetic) and environmental factor, but its exact cause is unknown. Asthma may be triggered by allergens, lung infections, or irritants in the air. Asthma triggers are different for each person\par \par problem 2: upper airway cough syndrome/post nasal drip syndrome- seasonal allergies \par -he is being recommended to use nasal saline\par -seasonal sensation- Clarinex 5 mg QHS \par \par -Environmental measures for allergies were encouraged including mattress and pillow cover, air purifier, and environmental controls.\par \par problem 3: LPR/GERD\par - add Reglan 5 mg pre-meal, QHS(11/2021)\par - Recommended organic apple cider vinegar tablets \par -continue to use omeprazole 40 mg before breakfast\par -continue Pepcid 40 mg QHS\par -Rule of 2s: avoid eating too much, eating too late, eating too spicy, eating two hours before bed\par -Things to avoid including overeating, spicy foods, tight clothing, eating within three hours of bed, this list is not all inclusive. \par -For treatment of reflux, possible options discussed including diet control, H2 blockers, PPIs, as well as coating motility agents discussed as treatment options. Timing of meals and proximity of last meal to sleep were discussed. If symptoms persist, a formal gastrointestinal evaluation is needed.\par \par problem 4: r/o biological use?\par - He had blood work, the results of which were as follows: IgE level (+), eosinophil level (-), and vitamin D level (LOW) - elevated IgE level and low normal vitamin D level\par \par problem 5: obesity (240 lb)\par -Recommend "Muniq" OTC Supplement for weight loss, energy levels, and blood sugar levels \par -recommended to have a sneaker evaluation by the [employer] \par -Weight loss, exercise, and diet control were discussed and are highly encouraged. Treatment options were given such as, aqua therapy, and contacting a nutritionist. Recommended to use the elliptical, stationary bike, less use of treadmill. Obesity is associated with worsening asthma, shortness of breath, and potential for cardiac disease, diabetes, and other underlying medical conditions.\par \par problem 6: AISSAOTU - compliant \par - Referred to Dr. Price Carson for dental device; Recommended Somnifix\par -Recommended "Perrigo" sleep aid\par -he is using CPAP device at this time as per Dr. Aggarwal \par -he is being recommended to research the Hypoglossal Nerve Stimulator- Inspire Device, oral appliance, or ProVent\par -he was given a prescription for ProVent in the interim \par \par -Sleep apnea is associated with adverse clinical consequences which an affect most organ systems. Cardiovascular disease risk includes arrhythmias, atrial fibrillation, hypertension, coronary artery disease, and stroke. Metabolic disorders include diabetes type 2, non-alcoholic fatty liver disease. Mood disorder especially depression; and cognitive decline especially in the elderly. Associations with chronic reflux/Webb’s esophagus some but not all inclusive. \par -Reasons to assess this include arousal consistent with hypopnea; respiratory events most prominent in REM sleep or supine position; therefore sleep staging and body position are important for accurate diagnosis and estimation of AHI. \par -Treatment options discussed including CPAP/BiPAP machine, oral appliance, ProVent therapy, Oxy-Aid by [employer], new technologies, or positional sleep.Recommended use of the CPAP machine for moderate (AHI >15), moderate to severe (AHI 15-30) and severe patients (AHI > 30). Recommended weight loss which can reduce AHI especially in weight loss of greater than 5% of BMI. Positional sleep is recommended in those with low AHI, low-moderate MBI, and younger age. For severe sleep apnea, the hypoglossal nerve stimulator was recommended as well. 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 Xolair is produced by a Chinese hamster ovary cell suspension culture in nutrient medium containing the antibiotic gentamicin. Gentamicin is not detectable in the final product. Xolair is a sterile, white, preservative free, lyophilized powder contained in a single use vial that is reconstituted with sterile water for suspension. Side effects include: wheezing, tightness of the chest, trouble breathing, hives, skin rash, feeling anxious or light-headed, fainting, warmth or tingling under skin, or swelling of face, lips, or tongue \par \par problem 8: low vitamin D level\par -low normal vitamin D \par -Has been associated with asthma exacerbations and increased allergic symptoms. The goal based on recent information is maintaining levels between 50-70 and low normal is 30. Recommended 50,000 units every two weeks to once a month depending on the level. 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 (refused)\par \par \par F/U in 4 months with spi. ENT - Doris et al.\par He is encouraged to call with any changes, concerns, or questions.

## 2021-11-09 NOTE — HISTORY OF PRESENT ILLNESS
[FreeTextEntry1] : Mr. Lemons is a 71 year old male comes in today for a follow up visit for asthma, chronic cough, AISSATOU, and PND. His chief complaint is \par -He notes doing well in general\par -He notes lower energy level\par -He notes slight decline in memory\par -He notes sleep is interrupted throughout the night every 4 hours due to AISSATOU- no improvement in CPAP machine \par -He notes drinking water right before bed \par -He notes productive coughing throughout the day \par -he notes his bowels are regular \par -\par \par - patient denies any headaches, nausea, vomiting, fever, chills, sweats, chest pain, chest pressure, palpitations, wheezing, fatigue, diarrhea, constipation, dysphagia, myalgias, dizziness, leg swelling, leg pain, itchy eyes, itchy ears, heartburn, reflux or sour taste in the mouth

## 2021-11-09 NOTE — ADDENDUM
[FreeTextEntry1] : Documented by Haroon Cantrell acting as a scribe for Dr. Miguel Angel Peters on (11/09/2021).\par \par All medical record entries made by the Scribe were at my, Dr. Miguel Angel Peters's, direction and personally dictated by me on (11/09/2021). I have reviewed the chart and agree that the record accurately reflects my personal performance of the history, physical exam, assessment and plan. I have also personally directed, reviewed, and agree with the discharge instructions.\par

## 2021-12-01 ENCOUNTER — TRANSCRIPTION ENCOUNTER (OUTPATIENT)
Age: 71
End: 2021-12-01

## 2021-12-16 RX ORDER — LEVOCETIRIZINE DIHYDROCHLORIDE 5 MG/1
5 TABLET ORAL
Qty: 1 | Refills: 1 | Status: ACTIVE | COMMUNITY
Start: 2020-04-14 | End: 1900-01-01

## 2021-12-17 ENCOUNTER — TRANSCRIPTION ENCOUNTER (OUTPATIENT)
Age: 71
End: 2021-12-17

## 2022-01-07 ENCOUNTER — TRANSCRIPTION ENCOUNTER (OUTPATIENT)
Age: 72
End: 2022-01-07

## 2022-04-26 ENCOUNTER — TRANSCRIPTION ENCOUNTER (OUTPATIENT)
Age: 72
End: 2022-04-26

## 2022-05-10 ENCOUNTER — APPOINTMENT (OUTPATIENT)
Dept: PULMONOLOGY | Facility: CLINIC | Age: 72
End: 2022-05-10
Payer: COMMERCIAL

## 2022-05-10 ENCOUNTER — NON-APPOINTMENT (OUTPATIENT)
Age: 72
End: 2022-05-10

## 2022-05-10 VITALS
DIASTOLIC BLOOD PRESSURE: 82 MMHG | HEIGHT: 70 IN | SYSTOLIC BLOOD PRESSURE: 130 MMHG | OXYGEN SATURATION: 98 % | BODY MASS INDEX: 34.36 KG/M2 | TEMPERATURE: 97 F | HEART RATE: 68 BPM | WEIGHT: 240 LBS | RESPIRATION RATE: 17 BRPM

## 2022-05-10 PROCEDURE — 95012 NITRIC OXIDE EXP GAS DETER: CPT

## 2022-05-10 PROCEDURE — 94010 BREATHING CAPACITY TEST: CPT

## 2022-05-10 PROCEDURE — 99214 OFFICE O/P EST MOD 30 MIN: CPT | Mod: 25

## 2022-05-10 RX ORDER — DEXLANSOPRAZOLE 60 MG/1
60 CAPSULE, DELAYED RELEASE ORAL
Qty: 90 | Refills: 1 | Status: ACTIVE | COMMUNITY
Start: 2022-05-10 | End: 1900-01-01

## 2022-05-11 NOTE — PROCEDURE
[FreeTextEntry1] : PFT revealed normal flows, with a FEV1 of 3.03L,which is 98% of predicted with a normal flow volume loop fattened inspiratory limb\par \par FENO was 16 ; a normal value being less than 25\par Fractional exhaled nitric oxide (FENO) is regarded as a simple, noninvasive method for assessing eosinophilic airway inflammation. Produced by a variety of cells within the lung, nitric oxide (NO) concentrations are generally low in healthy individuals. However, high concentrations of NO appear to be involved in nonspecific host defense mechanisms and chronic inflammatory diseases such as asthma. The American Thoracic Society (ATS) therefore has recommended using FENO to aid in the diagnosis and monitoring of eosinophilic airway inflammation and asthma, and for identifying steroid responsive individuals whose chronic respiratory symptoms may be caused by airway inflammation.

## 2022-05-11 NOTE — ADDENDUM
[FreeTextEntry1] : Documented by Jose Burdick acting as a scribe for Dr. Miguel Angel Peters on 05/10/2022 \par \par All medical record entries made by the Scribe were at my, Dr. Miguel Angel Peters's, direction and personally dictated by me on 05/10/2022 . I have reviewed the chart and agree that the record accurately reflects my personal performance of the history, physical exam, assessment and plan. I have also personally directed, reviewed, and agree with the discharge instructions

## 2022-05-11 NOTE — ASSESSMENT
[FreeTextEntry1] : Mr. Lemons is a 72 year old male with a history of HTN, diabetes, HLD, sleep apnea, asthma, allergy, LPR, obesity and chronic cough comes in for pulmonary re-evaluation. His number one issue today is throat, #2 sleep #3 balance\par \par His chronic cough is felt to be multifactorial due to:\par -Asthma / COPD\par -Post nasal drip syndrome\par -GERD \par \par problem 1: moderate asthma (stable)\par - S/p MCT c/w asthma \par -Breo 200 1 puff QD \par -Ventolin 2 puffs q6H PRN \par -Singulair 10 mg QHS \par -Asthma is believed to be caused by inherited (genetic) and environmental factor, but its exact cause is unknown. Asthma may be triggered by allergens, lung infections, or irritants in the air. Asthma triggers are different for each person\par \par problem 2: upper airway cough syndrome/post nasal drip syndrome- seasonal allergies \par -he is being recommended to use nasal saline\par -seasonal sensation- Clarinex 5 mg QHS \par \par -Environmental measures for allergies were encouraged including mattress and pillow cover, air purifier, and environmental controls.\par \par problem 3: LPR/GERD-active \par - continue Reglan 5 mg pre-meal, QHS(11/2021)\par - Recommended organic apple cider vinegar tablets \par -add Dexilant 60 mg QAM, pre-breakfast \par -continue Pepcid 40 mg QHS\par -Rule of 2s: avoid eating too much, eating too late, eating too spicy, eating two hours before bed\par -Things to avoid including overeating, spicy foods, tight clothing, eating within three hours of bed, this list is not all inclusive. \par -For treatment of reflux, possible options discussed including diet control, H2 blockers, PPIs, as well as coating motility agents discussed as treatment options. Timing of meals and proximity of last meal to sleep were discussed. If symptoms persist, a formal gastrointestinal evaluation is needed.\par \par problem 4: r/o biological use?\par - He had blood work, the results of which were as follows: IgE level (+), eosinophil level (-), and vitamin D level (LOW) - elevated IgE level and low normal vitamin D level\par \par problem 5: obesity (240 lb)\par -Recommend "Muniq" OTC Supplement for weight loss, energy levels, and blood sugar levels \par -recommended to have a sneaker evaluation by the [employer] \par -Weight loss, exercise, and diet control were discussed and are highly encouraged. Treatment options were given such as, aqua therapy, and contacting a nutritionist. Recommended to use the elliptical, stationary bike, less use of treadmill. Obesity is associated with worsening asthma, shortness of breath, and potential for cardiac disease, diabetes, and other underlying medical conditions.\par \par problem 6: AISSATOU - compliant \par - Referred to Dr. Price Carson for dental device; Recommended Somnifix\par -Recommended "Perrigo" sleep aid\par -he is using CPAP device at this time as per Dr. Aggarwal \par -he is being recommended to research the Hypoglossal Nerve Stimulator- Inspire Device, oral appliance, or ProVent\par -he was given a prescription for ProVent in the interim \par -recommended "Excite" \par \par -Sleep apnea is associated with adverse clinical consequences which an affect most organ systems. Cardiovascular disease risk includes arrhythmias, atrial fibrillation, hypertension, coronary artery disease, and stroke. Metabolic disorders include diabetes type 2, non-alcoholic fatty liver disease. Mood disorder especially depression; and cognitive decline especially in the elderly. Associations with chronic reflux/Webb’s esophagus some but not all inclusive. \par -Reasons to assess this include arousal consistent with hypopnea; respiratory events most prominent in REM sleep or supine position; therefore sleep staging and body position are important for accurate diagnosis and estimation of AHI. \par -Treatment options discussed including CPAP/BiPAP machine, oral appliance, ProVent therapy, Oxy-Aid by [employer], new technologies, or positional sleep.Recommended use of the CPAP machine for moderate (AHI >15), moderate to severe (AHI 15-30) and severe patients (AHI > 30). Recommended weight loss which can reduce AHI especially in weight loss of greater than 5% of BMI. Positional sleep is recommended in those with low AHI, low-moderate MBI, and younger age. For severe sleep apnea, the hypoglossal nerve stimulator was recommended as well. 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 Xolair is produced by a Chinese hamster ovary cell suspension culture in nutrient medium containing the antibiotic gentamicin. Gentamicin is not detectable in the final product. Xolair is a sterile, white, preservative free, lyophilized powder contained in a single use vial that is reconstituted with sterile water for suspension. Side effects include: wheezing, tightness of the chest, trouble breathing, hives, skin rash, feeling anxious or light-headed, fainting, warmth or tingling under skin, or swelling of face, lips, or tongue \par \par problem 8: low vitamin D level\par -low normal vitamin D \par -Has been associated with asthma exacerbations and increased allergic symptoms. The goal based on recent information is maintaining levels between 50-70 and low normal is 30. Recommended 50,000 units every two weeks to once a month depending on the level. 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 (refused)\par \par F/U in 4 months with spi. ENT - [facility]\par He is encouraged to call with any changes, concerns, or questions.

## 2022-05-11 NOTE — PHYSICAL EXAM
[No Acute Distress] : no acute distress [Normal Oropharynx] : normal oropharynx [III] : Mallampati Class: III [Normal Appearance] : normal appearance [No Neck Mass] : no neck mass [Normal Rate/Rhythm] : normal rate/rhythm [Normal S1, S2] : normal s1, s2 [No Murmurs] : no murmurs [No Resp Distress] : no resp distress [Clear to Auscultation Bilaterally] : clear to auscultation bilaterally [No Abnormalities] : no abnormalities [Benign] : benign [Normal Gait] : normal gait [No Clubbing] : no clubbing [No Cyanosis] : no cyanosis [FROM] : FROM [Normal Color/ Pigmentation] : normal color/ pigmentation [No Focal Deficits] : no focal deficits [Oriented x3] : oriented x3 [Normal Affect] : normal affect [TextBox_2] : OW  [TextBox_68] : I:E ratio 1:3; clear  [TextBox_105] : 1+ LE edema

## 2022-05-11 NOTE — HISTORY OF PRESENT ILLNESS
[FreeTextEntry1] : Mr. Lemons is a 72 year old male comes in today for a follow up visit for asthma, chronic cough, AISSATOU, and PND. His chief complaint is \par \par -he notes generally feeling okay \par -he notes working from home and now excited to go back back full time \par -he notes traveling every 4 months due to work \par -he notes poor balance \par -he notes sense of smell and taste is table\par -he notes chronic LE edema that is unchanged\par -he denies getting enough sleep \par -he notes poor quality of sleep due to multiple interruptions \par -he notes mild allergic symptoms\par -he notes persistent mucus production that lingers in throat \par -he notes persistent dysphonia \par \par -denies any visual issues, headaches, nausea, vomiting, fever, chills, sweats, chest pain, chest pressure, diarrhea, constipation, dysphagia, dizziness, leg pain, itchy eyes, itchy ears, heartburn, reflux, or sour taste in the mouth.

## 2022-06-06 ENCOUNTER — NON-APPOINTMENT (OUTPATIENT)
Age: 72
End: 2022-06-06

## 2022-06-11 ENCOUNTER — NON-APPOINTMENT (OUTPATIENT)
Age: 72
End: 2022-06-11

## 2022-07-19 ENCOUNTER — NON-APPOINTMENT (OUTPATIENT)
Age: 72
End: 2022-07-19

## 2022-09-13 ENCOUNTER — NON-APPOINTMENT (OUTPATIENT)
Age: 72
End: 2022-09-13

## 2022-09-13 ENCOUNTER — APPOINTMENT (OUTPATIENT)
Dept: PULMONOLOGY | Facility: CLINIC | Age: 72
End: 2022-09-13

## 2022-09-13 VITALS
BODY MASS INDEX: 34.3 KG/M2 | WEIGHT: 245 LBS | TEMPERATURE: 97.6 F | OXYGEN SATURATION: 95 % | HEIGHT: 71 IN | DIASTOLIC BLOOD PRESSURE: 80 MMHG | SYSTOLIC BLOOD PRESSURE: 132 MMHG | RESPIRATION RATE: 16 BRPM | HEART RATE: 97 BPM

## 2022-09-13 PROCEDURE — 95012 NITRIC OXIDE EXP GAS DETER: CPT

## 2022-09-13 PROCEDURE — 94010 BREATHING CAPACITY TEST: CPT

## 2022-09-13 PROCEDURE — 99214 OFFICE O/P EST MOD 30 MIN: CPT | Mod: 25

## 2022-09-13 NOTE — PROCEDURE
[FreeTextEntry1] : PFT reveals normal flows, with an FEV1 of  2.92L, which is 90% of predicted, with a normal flow volume loop. \par \par FENO was 6; a normal value being less than 25\par Fractional exhaled nitric oxide (FENO) is regarded as a simple, noninvasive method for assessing eosinophilic airway inflammation. Produced by a variety of cells within the lung, nitric oxide (NO) concentrations are generally low in healthy individuals. However, high concentrations of NO appear to be involved in nonspecific host defense mechanisms and chronic inflammatory diseases such as asthma. The American Thoracic Society (ATS) therefore has recommended using FENO to aid in the diagnosis and monitoring of eosinophilic airway inflammation and asthma, and for identifying steroid responsive individuals whose chronic respiratory symptoms may be caused by airway inflammation.

## 2022-09-13 NOTE — HISTORY OF PRESENT ILLNESS
[FreeTextEntry1] : Mr. Lemons is a 72 year old male comes in today for a follow up visit for asthma, chronic cough, AISSATOU, and PND. His chief complaint is \par \par -he notes energy levels are decreased due to age\par -he notes bowels are good and regular \par -s/p COVID-19 vaccine x4- 5th pending on 9/17/2022\par -he notes mucus production due to GERD\par -he notes vision is stable \par -he notes poor quality of sleep \par -he notes sleeping for 7 hrs in 3 sessions\par -he notes CPAP and DD did not improve sleep\par -he notes intermittent balance issues\par -he denies taking any new medications, vitamins, or supplements \par -he notes gaining weight \par \par -he denies any arthralgias, chest pain, chest pressure, chills, constipation, coughing, diarrhea, dizziness, dysphagia, fever, headaches, heartburn, reflux, itchy eyes, itchy ears, leg swelling, leg pain, myalgias, nausea, palpitations, sweats, vomiting, wheezing or sour taste in the mouth.

## 2022-09-13 NOTE — PHYSICAL EXAM
[No Acute Distress] : no acute distress [Normal Oropharynx] : normal oropharynx [III] : Mallampati Class: III [Normal Appearance] : normal appearance [No Neck Mass] : no neck mass [Normal Rate/Rhythm] : normal rate/rhythm [Normal S1, S2] : normal s1, s2 [No Murmurs] : no murmurs [No Resp Distress] : no resp distress [Clear to Auscultation Bilaterally] : clear to auscultation bilaterally [No Abnormalities] : no abnormalities [Benign] : benign [Normal Gait] : normal gait [No Clubbing] : no clubbing [No Cyanosis] : no cyanosis [FROM] : FROM [Normal Color/ Pigmentation] : normal color/ pigmentation [No Focal Deficits] : no focal deficits [Oriented x3] : oriented x3 [Normal Affect] : normal affect [TextBox_2] : OW  [TextBox_54] : 3/6 systolic murmur [TextBox_68] : I:E ratio 1:3; clear  [TextBox_105] : 1+ LE edema

## 2022-09-13 NOTE — ASSESSMENT
[FreeTextEntry1] : Mr. Lemons is a 72 year old male with a history of HTN, diabetes, HLD, sleep apnea, asthma, allergy, LPR, obesity and chronic cough comes in for pulmonary re-evaluation. His number one issue today is throat clearing, #2 sleep #3 balance (still)\par \par His chronic cough is felt to be multifactorial due to:\par -Asthma / COPD\par -Post nasal drip syndrome\par -GERD \par \par problem 1: moderate asthma (stable)\par - S/p MCT c/w asthma \par -Breo 200 1 puff QD \par -Ventolin 2 puffs q6H PRN \par -Singulair 10 mg QHS \par -Asthma is believed to be caused by inherited (genetic) and environmental factor, but its exact cause is unknown. Asthma may be triggered by allergens, lung infections, or irritants in the air. Asthma triggers are different for each person\par \par problem 2: upper airway cough syndrome/post nasal drip syndrome- seasonal allergies \par -he is being recommended to use nasal saline\par -seasonal sensation- Clarinex 5 mg QHS \par \par -Environmental measures for allergies were encouraged including mattress and pillow cover, air purifier, and environmental controls.\par \par problem 3: LPR/GERD-active \par - continue Reglan 5 mg pre-meal, QHS(11/2021)\par - Recommended organic apple cider vinegar tablets \par -continue Dexilant 60 mg QAM, pre-breakfast \par -continue Pepcid 40 mg QHS\par -recommended "10-Day Detox Diet" by Dr. Patric Barry \par -Rule of 2s: avoid eating too much, eating too late, eating too spicy, eating two hours before bed\par -Things to avoid including overeating, spicy foods, tight clothing, eating within three hours of bed, this list is not all inclusive. \par -For treatment of reflux, possible options discussed including diet control, H2 blockers, PPIs, as well as coating motility agents discussed as treatment options. Timing of meals and proximity of last meal to sleep were discussed. If symptoms persist, a formal gastrointestinal evaluation is needed.\par \par problem 4: r/o biological use?\par - He had blood work, the results of which were as follows: IgE level (+), eosinophil level (-), and vitamin D level (LOW) - elevated IgE level and low normal vitamin D level\par \par problem 5: obesity (245 lb)\par -Recommend "Muniq" OTC Supplement for weight loss, energy levels, and blood sugar levels \par -recommended to have a sneaker evaluation by the [employer] \par -Weight loss, exercise, and diet control were discussed and are highly encouraged. Treatment options were given such as, aqua therapy, and contacting a nutritionist. Recommended to use the elliptical, stationary bike, less use of treadmill. Obesity is associated with worsening asthma, shortness of breath, and potential for cardiac disease, diabetes, and other underlying medical conditions.\par \par problem 6: AISSATOU - compliant \par - Referred to Dr. Price Carson for dental device; Recommended Somnifix if able\par -Recommended "Perrigo" sleep aid\par -he is using CPAP device at this time as per Dr. Aggarwal \par -he is being recommended to research the Hypoglossal Nerve Stimulator- Inspire Device ([place]), oral appliance, or ProVent\par -recommended surgical evaluation ([facility])\par \par -Sleep apnea is associated with adverse clinical consequences which an affect most organ systems. Cardiovascular disease risk includes arrhythmias, atrial fibrillation, hypertension, coronary artery disease, and stroke. Metabolic disorders include diabetes type 2, non-alcoholic fatty liver disease. Mood disorder especially depression; and cognitive decline especially in the elderly. Associations with chronic reflux/Webb’s esophagus some but not all inclusive. \par -Reasons to assess this include arousal consistent with hypopnea; respiratory events most prominent in REM sleep or supine position; therefore sleep staging and body position are important for accurate diagnosis and estimation of AHI. \par -Treatment options discussed including CPAP/BiPAP machine, oral appliance, ProVent therapy, Oxy-Aid by [employer]pite[employer], new technologies, or positional sleep.Recommended use of the CPAP machine for moderate (AHI >15), moderate to severe (AHI 15-30) and severe patients (AHI > 30). Recommended weight loss which can reduce AHI especially in weight loss of greater than 5% of BMI. Positional sleep is recommended in those with low AHI, low-moderate MBI, and younger age. For severe sleep apnea, the hypoglossal nerve stimulator was recommended as well. 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 Xolair is produced by a Chinese hamster ovary cell suspension culture in nutrient medium containing the antibiotic gentamicin. Gentamicin is not detectable in the final product. Xolair is a sterile, white, preservative free, lyophilized powder contained in a single use vial that is reconstituted with sterile water for suspension. Side effects include: wheezing, tightness of the chest, trouble breathing, hives, skin rash, feeling anxious or light-headed, fainting, warmth or tingling under skin, or swelling of face, lips, or tongue \par \par problem 8: low vitamin D level\par -low normal vitamin D \par -Has been associated with asthma exacerbations and increased allergic symptoms. The goal based on recent information is maintaining levels between 50-70 and low normal is 30. Recommended 50,000 units every two weeks to once a month depending on the level. 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 (refused)\par \par F/U in 4 months with spi. ENT - Doris et al.\par He is encouraged to call with any changes, concerns, or questions.

## 2022-09-13 NOTE — ADDENDUM
[FreeTextEntry1] : Documented by BERTIN Sahu acting as a scribe for Dr. Miguel Angel Peters on 09/13/2022 .\par All medical record entries made by the Scribe were at my, Dr. Miguel Angel Peters's, direction and personally dictated by me on 09/13/2022. I have reviewed the chart and agree that the record accurately reflects my personal performance of the history, physical exam, assessment and plan. I have also personally directed, reviewed, and agree with the discharge instructions.

## 2022-09-27 ENCOUNTER — NON-APPOINTMENT (OUTPATIENT)
Age: 72
End: 2022-09-27

## 2022-09-29 RX ORDER — FLUTICASONE FUROATE AND VILANTEROL TRIFENATATE 200; 25 UG/1; UG/1
200-25 POWDER RESPIRATORY (INHALATION)
Qty: 3 | Refills: 0 | Status: ACTIVE | COMMUNITY
Start: 2017-08-03 | End: 1900-01-01

## 2023-01-10 RX ORDER — ALBUTEROL SULFATE 90 UG/1
108 (90 BASE) INHALANT RESPIRATORY (INHALATION)
Qty: 1 | Refills: 2 | Status: ACTIVE | COMMUNITY
Start: 2017-08-03 | End: 1900-01-01

## 2023-01-23 RX ORDER — FLUTICASONE FUROATE AND VILANTEROL TRIFENATATE 200; 25 UG/1; UG/1
200-25 POWDER RESPIRATORY (INHALATION)
Qty: 3 | Refills: 1 | Status: ACTIVE | COMMUNITY
Start: 2017-08-03 | End: 1900-01-01

## 2023-01-31 ENCOUNTER — NON-APPOINTMENT (OUTPATIENT)
Age: 73
End: 2023-01-31

## 2023-04-04 ENCOUNTER — NON-APPOINTMENT (OUTPATIENT)
Age: 73
End: 2023-04-04

## 2023-04-04 ENCOUNTER — APPOINTMENT (OUTPATIENT)
Dept: PULMONOLOGY | Facility: CLINIC | Age: 73
End: 2023-04-04
Payer: COMMERCIAL

## 2023-04-04 VITALS
SYSTOLIC BLOOD PRESSURE: 140 MMHG | TEMPERATURE: 97.3 F | OXYGEN SATURATION: 98 % | HEIGHT: 71 IN | RESPIRATION RATE: 16 BRPM | HEART RATE: 65 BPM | BODY MASS INDEX: 33.32 KG/M2 | DIASTOLIC BLOOD PRESSURE: 80 MMHG | WEIGHT: 238 LBS

## 2023-04-04 PROCEDURE — 99214 OFFICE O/P EST MOD 30 MIN: CPT | Mod: 25

## 2023-04-04 PROCEDURE — 95012 NITRIC OXIDE EXP GAS DETER: CPT

## 2023-04-04 PROCEDURE — 94010 BREATHING CAPACITY TEST: CPT

## 2023-04-04 NOTE — ASSESSMENT
[FreeTextEntry1] : Mr. Lemons is a 72 year old male with a history of HTN, diabetes, HLD, sleep apnea, asthma, allergy, LPR, obesity and chronic cough comes in for pulmonary re-evaluation. His #1 issue is sleep; #2 is balance #2 is fitness\par \par His chronic cough is felt to be multifactorial due to:\par -Asthma / COPD\par -Post nasal drip syndrome\par -GERD \par \par problem 1: moderate asthma (stable)\par - S/p MCT c/w asthma \par -Breo 200 1 puff QD \par -Ventolin 2 puffs q6H PRN \par -Singulair 10 mg QHS \par -Asthma is believed to be caused by inherited (genetic) and environmental factor, but its exact cause is unknown. Asthma may be triggered by allergens, lung infections, or irritants in the air. Asthma triggers are different for each person\par \par problem 2: upper airway cough syndrome/post nasal drip syndrome- seasonal allergies \par -he is being recommended to use nasal saline\par -seasonal sensation- Clarinex 5 mg QHS \par \par -Environmental measures for allergies were encouraged including mattress and pillow cover, air purifier, and environmental controls.\par \par problem 3: LPR/GERD- improved\par - continue Reglan 5 mg pre-meal, QHS(11/2021)\par - Recommended organic apple cider vinegar tablets \par -continue Dexilant 60 mg QAM, pre-breakfast \par -continue Pepcid 40 mg QHS\par -recommended "10-Day Detox Diet" by Dr. Patric Barry \par -Rule of 2s: avoid eating too much, eating too late, eating too spicy, eating two hours before bed\par -Things to avoid including overeating, spicy foods, tight clothing, eating within three hours of bed, this list is not all inclusive. \par -For treatment of reflux, possible options discussed including diet control, H2 blockers, PPIs, as well as coating motility agents discussed as treatment options. Timing of meals and proximity of last meal to sleep were discussed. If symptoms persist, a formal gastrointestinal evaluation is needed.\par \par problem 4: r/o biological use?\par - He had blood work, the results of which were as follows: IgE level (+), eosinophil level (-), and vitamin D level (LOW) - elevated IgE level and low normal vitamin D level\par \par problem 5: obesity (245 lb)\par -Recommend "Muniq" OTC Supplement for weight loss, energy levels, and blood sugar levels \par -recommended to have a sneaker evaluation by the [employer] \par -Weight loss, exercise, and diet control were discussed and are highly encouraged. Treatment options were given such as, aqua therapy, and contacting a nutritionist. Recommended to use the elliptical, stationary bike, less use of treadmill. Obesity is associated with worsening asthma, shortness of breath, and potential for cardiac disease, diabetes, and other underlying medical conditions.\par \par problem 6: AISSATOU - compliant \par -complete home sleep study \par -recommended eXciteOSA therapy device \par -recommended Snore [place] SnoreLAB™ Pro \par - off dental device; Recommended Somnifix if able\par -Recommended "Perrigo" sleep aid\par -off CPAP device at this time as per Dr. Aggarwal \par -he is being recommended to research the Hypoglossal Nerve Stimulator- Inspire Device ([place]), oral appliance, or ProVent\par -recommended surgical evaluation ([facility])\par -recommended myosedate pre bed\par -recommended epsom salt bath prior to bed\par -add Nuvigil 250 mg QAM upon awakening (I-STOP)\par \par -Sleep apnea is associated with adverse clinical consequences which an affect most organ systems. Cardiovascular disease risk includes arrhythmias, atrial fibrillation, hypertension, coronary artery disease, and stroke. Metabolic disorders include diabetes type 2, non-alcoholic fatty liver disease. Mood disorder especially depression; and cognitive decline especially in the elderly. Associations with chronic reflux/Webb’s esophagus some but not all inclusive. \par -Reasons to assess this include arousal consistent with hypopnea; respiratory events most prominent in REM sleep or supine position; therefore sleep staging and body position are important for accurate diagnosis and estimation of AHI. \par -Treatment options discussed including CPAP/BiPAP machine, oral appliance, ProVent therapy, Oxy-Aid by [employer], new technologies, or positional sleep.Recommended use of the CPAP machine for moderate (AHI >15), moderate to severe (AHI 15-30) and severe patients (AHI > 30). Recommended weight loss which can reduce AHI especially in weight loss of greater than 5% of BMI. Positional sleep is recommended in those with low AHI, low-moderate MBI, and younger age. For severe sleep apnea, the hypoglossal nerve stimulator was recommended as well. 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 Xolair is produced by a Chinese hamster ovary cell suspension culture in nutrient medium containing the antibiotic gentamicin. Gentamicin is not detectable in the final product. Xolair is a sterile, white, preservative free, lyophilized powder contained in a single use vial that is reconstituted with sterile water for suspension. Side effects include: wheezing, tightness of the chest, trouble breathing, hives, skin rash, feeling anxious or light-headed, fainting, warmth or tingling under skin, or swelling of face, lips, or tongue \par \par problem 8: low vitamin D level\par -low normal vitamin D \par -Has been associated with asthma exacerbations and increased allergic symptoms. The goal based on recent information is maintaining levels between 50-70 and low normal is 30. Recommended 50,000 units every two weeks to once a month depending on the level. 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 (refused)\par \par F/U in 4 months with spi. ENT - [facility].\par He is encouraged to call with any changes, concerns, or questions.

## 2023-04-04 NOTE — HISTORY OF PRESENT ILLNESS
[FreeTextEntry1] : Mr. Lemons is a 72 year old male comes in today for a follow up visit for asthma, chronic cough, AISSATOU, and PND. His chief complaint is \par \par -he notes feeling generally well \par -he notes he has a cough for 1 week\par -he notes cough is improving\par -he notes cough is due to GERD\par -he notes vertigo\par -he notes balance is poor\par -he notes his senses of smell and taste are stable \par -he notes limited exercising (biking)\par -he notes weight is stable \par -he notes sleeping for 4hrs at night, then he wakes up and can't get back asleep\par -he notes sleeping for 7hrs total throughout the day\par \par -he denies any headaches, nausea, emesis, fever, chills, sweats, chest pain, chest pressure, wheezing, palpitations, constipation, diarrhea, dysphagia, heartburn, reflux, itchy eyes, itchy ears, leg swelling, arthralgias, myalgias, or sour taste in the mouth.

## 2023-04-04 NOTE — PHYSICAL EXAM
[No Acute Distress] : no acute distress [Normal Oropharynx] : normal oropharynx [III] : Mallampati Class: III [Normal Appearance] : normal appearance [No Neck Mass] : no neck mass [Normal Rate/Rhythm] : normal rate/rhythm [Normal S1, S2] : normal s1, s2 [No Resp Distress] : no resp distress [Clear to Auscultation Bilaterally] : clear to auscultation bilaterally [No Abnormalities] : no abnormalities [Benign] : benign [Normal Gait] : normal gait [No Clubbing] : no clubbing [No Cyanosis] : no cyanosis [FROM] : FROM [Normal Color/ Pigmentation] : normal color/ pigmentation [No Focal Deficits] : no focal deficits [Oriented x3] : oriented x3 [Normal Affect] : normal affect [TextBox_2] : OW  [TextBox_54] : 3/6 systolic murmur [TextBox_68] : I:E ratio 1:3; clear  [TextBox_105] : trace LE edema

## 2023-04-04 NOTE — ADDENDUM
[FreeTextEntry1] : Documented by BERTIN Sahu acting as a scribe for Dr. Miguel Angel Peters on 04/04/2023 .\par \par All medical record entries made by the Scribe were at my, Dr. Miguel Angel Peters's, direction and personally dictated by me on 04/04/2023. I have reviewed the chart and agree that the record accurately reflects my personal performance of the history, physical exam, assessment and plan. I have also personally directed, reviewed, and agree with the discharge instructions.

## 2023-04-04 NOTE — PROCEDURE
[FreeTextEntry1] : PFT reveals normal flows, with an FEV1 of  2.98L, which is 92% of predicted, with a normal flow volume loop. \par \par bloodwork 11.8.2022 revealed wbc 5.2 hgb 14.7 hct 42.5 plt 157 eos 99 tft wnl hgb a1c 8.8\par \par FENO was 35; a normal value being less than 25\par Fractional exhaled nitric oxide (FENO) is regarded as a simple, noninvasive method for assessing eosinophilic airway inflammation. Produced by a variety of cells within the lung, nitric oxide (NO) concentrations are generally low in healthy individuals. However, high concentrations of NO appear to be involved in nonspecific host defense mechanisms and chronic inflammatory diseases such as asthma. The American Thoracic Society (ATS) therefore has recommended using FENO to aid in the diagnosis and monitoring of eosinophilic airway inflammation and asthma, and for identifying steroid responsive individuals whose chronic respiratory symptoms may be caused by airway inflammation.

## 2023-11-14 ENCOUNTER — APPOINTMENT (OUTPATIENT)
Dept: PULMONOLOGY | Facility: CLINIC | Age: 73
End: 2023-11-14
Payer: COMMERCIAL

## 2023-11-14 VITALS
BODY MASS INDEX: 32.7 KG/M2 | RESPIRATION RATE: 16 BRPM | SYSTOLIC BLOOD PRESSURE: 126 MMHG | TEMPERATURE: 97.2 F | WEIGHT: 233.6 LBS | DIASTOLIC BLOOD PRESSURE: 66 MMHG | OXYGEN SATURATION: 97 % | HEIGHT: 71 IN | HEART RATE: 66 BPM

## 2023-11-14 DIAGNOSIS — R05.3 CHRONIC COUGH: ICD-10-CM

## 2023-11-14 PROCEDURE — 94010 BREATHING CAPACITY TEST: CPT

## 2023-11-14 PROCEDURE — 99214 OFFICE O/P EST MOD 30 MIN: CPT | Mod: 25

## 2023-11-14 RX ORDER — ARMODAFINIL 250 MG/1
250 TABLET ORAL DAILY
Qty: 30 | Refills: 5 | Status: ACTIVE | COMMUNITY
Start: 2023-04-04 | End: 1900-01-01

## 2023-11-14 RX ORDER — METOCLOPRAMIDE 5 MG/1
5 TABLET ORAL
Qty: 360 | Refills: 1 | Status: ACTIVE | COMMUNITY
Start: 2021-11-09 | End: 1900-01-01

## 2023-11-14 RX ORDER — FAMOTIDINE 40 MG/1
40 TABLET, FILM COATED ORAL
Qty: 90 | Refills: 1 | Status: ACTIVE | COMMUNITY
Start: 2019-10-15 | End: 1900-01-01

## 2023-11-14 RX ORDER — MONTELUKAST 10 MG/1
10 TABLET, FILM COATED ORAL
Qty: 90 | Refills: 1 | Status: ACTIVE | COMMUNITY
Start: 2018-05-03 | End: 1900-01-01

## 2024-01-10 ENCOUNTER — NON-APPOINTMENT (OUTPATIENT)
Age: 74
End: 2024-01-10

## 2024-02-17 ENCOUNTER — NON-APPOINTMENT (OUTPATIENT)
Age: 74
End: 2024-02-17

## 2024-04-23 ENCOUNTER — APPOINTMENT (OUTPATIENT)
Dept: PULMONOLOGY | Facility: CLINIC | Age: 74
End: 2024-04-23
Payer: COMMERCIAL

## 2024-04-23 VITALS
SYSTOLIC BLOOD PRESSURE: 120 MMHG | TEMPERATURE: 97.4 F | OXYGEN SATURATION: 95 % | HEART RATE: 74 BPM | WEIGHT: 235 LBS | DIASTOLIC BLOOD PRESSURE: 70 MMHG | HEIGHT: 71 IN | BODY MASS INDEX: 32.9 KG/M2 | RESPIRATION RATE: 18 BRPM

## 2024-04-23 DIAGNOSIS — R79.89 OTHER SPECIFIED ABNORMAL FINDINGS OF BLOOD CHEMISTRY: ICD-10-CM

## 2024-04-23 DIAGNOSIS — K21.9 GASTRO-ESOPHAGEAL REFLUX DISEASE W/OUT ESOPHAGITIS: ICD-10-CM

## 2024-04-23 DIAGNOSIS — R26.89 OTHER ABNORMALITIES OF GAIT AND MOBILITY: ICD-10-CM

## 2024-04-23 DIAGNOSIS — J45.991 COUGH VARIANT ASTHMA: ICD-10-CM

## 2024-04-23 DIAGNOSIS — R76.8 OTHER SPECIFIED ABNORMAL IMMUNOLOGICAL FINDINGS IN SERUM: ICD-10-CM

## 2024-04-23 DIAGNOSIS — R06.02 SHORTNESS OF BREATH: ICD-10-CM

## 2024-04-23 DIAGNOSIS — G47.33 OBSTRUCTIVE SLEEP APNEA (ADULT) (PEDIATRIC): ICD-10-CM

## 2024-04-23 DIAGNOSIS — R09.82 POSTNASAL DRIP: ICD-10-CM

## 2024-04-23 DIAGNOSIS — E66.9 OBESITY, UNSPECIFIED: ICD-10-CM

## 2024-04-23 PROCEDURE — 94727 GAS DIL/WSHOT DETER LNG VOL: CPT

## 2024-04-23 PROCEDURE — 94010 BREATHING CAPACITY TEST: CPT

## 2024-04-23 PROCEDURE — 99214 OFFICE O/P EST MOD 30 MIN: CPT | Mod: 25

## 2024-04-23 PROCEDURE — 95012 NITRIC OXIDE EXP GAS DETER: CPT

## 2024-04-23 PROCEDURE — 94729 DIFFUSING CAPACITY: CPT

## 2024-04-23 RX ORDER — LEVOCETIRIZINE DIHYDROCHLORIDE 5 MG/1
5 TABLET ORAL
Qty: 90 | Refills: 1 | Status: ACTIVE | COMMUNITY
Start: 2018-09-11 | End: 1900-01-01

## 2024-04-23 RX ORDER — MONTELUKAST 10 MG/1
10 TABLET, FILM COATED ORAL
Qty: 1 | Refills: 1 | Status: ACTIVE | OUTPATIENT
Start: 2017-08-22

## 2024-04-23 RX ORDER — FAMOTIDINE 20 MG/1
20 TABLET, FILM COATED ORAL
Qty: 180 | Refills: 0 | Status: ACTIVE | COMMUNITY
Start: 2020-02-18 | End: 1900-01-01

## 2024-04-23 NOTE — ADDENDUM
[FreeTextEntry1] :  Documented by Griffin Marcum acting as a scribe for Dr. Miguel Angel Peters on 04/23/2024 .   All medical record entries made by the Scribe were at my, Dr. Miguel Angel Peters's direction and personally dictated by me on 04/23/2024 . I have reviewed the chart and agree that the record accurately reflects my personal performance of the history, Physical exam, assessment, and plan. I have also personally directed, reviewed, and agree with the discharge instructions.

## 2024-04-23 NOTE — HISTORY OF PRESENT ILLNESS
[FreeTextEntry1] : Mr. Lemons is a 73-year-old male who comes in today for a follow up pulmonary evaluation for asthma, chronic cough, AISSATOU, and PND. His chief complaint is   -he notes not feeling any different from being on new medications he notes that he got a new mask (full face mask) but doesnt feel too different when he uses it compared to his old mask. -he notes getting 6-7 hours of sleep, but it is interrupted -he notes recent weight loss going down to 235 lb -he notes he is not exercising enough and states he doesn't have many opportunities to work out more although he does bike -he notes he still has coughing and has not changed -he notes he has gotten 5x shots for COVID 19  -no new medications, vitamins, or supplements -he notes he got tested positive for COVID 19 once but was asymptomatic  -he notes sinuses are okay -he denies SOB -he denies any known allergies    -Patient denies any headaches, nausea, vomiting, fever, chills, sweats, chest pain, chest pressure, palpitations, wheezing, fatigue, diarrhea, constipation, dysphagia, arthralgias, myalgias, dizziness, leg swelling, leg pain, itchy eyes, itchy ears, dysphonia, heartburn, reflux or sour taste in mouth.

## 2024-04-23 NOTE — PROCEDURE
[FreeTextEntry1] : PFTs revealed normal flows; FEV1 was 3.00L, which is 96.4% of predicted; abnormal inspiratory limb. PFTs were performed to evaluate for asthma.    Full PFT reveals mild obstructive dysfunction at mid-low; FEV1 was 3.29 L which is 105 % of predicted, normal lung volumes, normal diffusions, at 27.6 L which is 117% predicted, normal flow volume loop. PFT's for performed to evaluate for SOB.    FENO was 30; a normal value being less than 25Fractional exhaled nitric oxide (FENO) is regarded as a simple, noninvasive method for assessing eosinophilic airway inflammation. Produced by a variety of cells within the lung, nitric oxide (NO) concentrations are generally low in healthy individuals. However, high concentrations of NO appear to be involved in nonspecific host defense mechanisms and chronic inflammatory diseases such as asthma. The American Thoracic Society (ATS) therefore has strongly recommended using FENO to aid in the assessment, management, and long-term monitoring of eosinophilic airway inflammation and asthma, and for identifying steroid responsive individuals whose chronic respiratory symptoms may be caused by airway inflammation. In their 2011 clinical practice guideline, the ATS emphasizes the importance of using FENO.

## 2024-04-23 NOTE — PHYSICAL EXAM
[TextBox_2] : OW  [TextBox_54] : 3/6 systolic murmur [TextBox_68] : I:E ratio 1:3; clear  [TextBox_105] : trace LE edema

## 2024-04-23 NOTE — ASSESSMENT
[FreeTextEntry1] : Mr. Lemons is a 73 year old male with a history of Afib (new 1/2024), HTN, diabetes, HLD, sleep apnea, asthma, allergy, LPR, obesity and chronic cough comes in for follow-up pulmonary evaluation. His #1 issue is sleep; #2 overworked-iazigess  His chronic cough is felt to be multifactorial due to: -Asthma / COPD -Post nasal drip syndrome -GERD  problem 1: moderate asthma (stable) - S/p MCT c/w asthma -Breo 200 1 puff QD -Ventolin 2 puffs q6H PRN -Singulair 10 mg QHS -Asthma is believed to be caused by inherited (genetic) and environmental factor, but its exact cause is unknown. Asthma may be triggered by allergens, lung infections, or irritants in the air. Asthma triggers are different for each person  problem 2: upper airway cough syndrome/post nasal drip syndrome- seasonal allergies -he is being recommended to use nasal saline -seasonal sensation- Clarinex 5 mg QHS -Environmental measures for allergies were encouraged including mattress and pillow cover, air purifier, and environmental controls.  problem 3: LPR/GERD- improved -recommended Reflux Gourmet  - continue Reglan 5 mg pre-meal, QHS(11/2021) - Recommended organic apple cider vinegar tablets -continue Dexilant 60 mg QAM, pre-breakfast -continue Pepcid 40 mg QHS -recommended "10-Day Detox Diet" by Dr. Patric Barry -Rule of 2s: avoid eating too much, eating too late, eating too spicy, eating two hours before bed -Things to avoid including overeating, spicy foods, tight clothing, eating within three hours of bed, this list is not all inclusive. -For treatment of reflux, possible options discussed including diet control, H2 blockers, PPIs, as well as coating motility agents discussed as treatment options. Timing of meals and proximity of last meal to sleep were discussed. If symptoms persist, a formal gastrointestinal evaluation is needed.  problem 4: r/o biological use? - He had blood work, the results of which were as follows: IgE level (+), eosinophil level (-), and vitamin D level (LOW) - elevated IgE level and low normal vitamin D level  problem 5: obesity (235 lbs)  -Recommend Berberine Synergy OTC supplement for visceral fat loss -recommended to have a sneaker evaluation by the [employer] -Weight loss, exercise, and diet control were discussed and are highly encouraged. Treatment options were given such as, aqua therapy, and contacting a nutritionist. Recommended to use the elliptical, stationary bike, less use of treadmill. Obesity is associated with worsening asthma, shortness of breath, and potential for cardiac disease, diabetes, and other underlying medical conditions.  problem 6: AISSATOU - compliant -recommended iNAP AISSATOU device  -s/p home sleep study (Alessia) -recommended eXciteOSA therapy device -recommended Snore Chilkat SnoreLAB Pro - off dental device; Recommended Somnifix if able -Recommended "Perrigo" sleep aid -using CPAP device at this time as per Dr. Aggarwal-[facility] -he is being recommended to research the Hypoglossal Nerve Stimulator- Inspire Device (Moat), oral appliance, or ProVent -recommended surgical evaluation (Kamran) -recommended myosedate pre bed -recommended epsom salt bath prior to bed -add Nuvigil 250 mg QAM upon awakening (I-STOP) -Sleep apnea is associated with adverse clinical consequences which an affect most organ systems. Cardiovascular disease risk includes arrhythmias, atrial fibrillation, hypertension, coronary artery disease, and stroke. Metabolic disorders include diabetes type 2, non-alcoholic fatty liver disease. Mood disorder especially depression; and cognitive decline especially in the elderly. Associations with chronic reflux/Webb's esophagus some but not all inclusive. -Reasons to assess this include arousal consistent with hypopnea; respiratory events most prominent in REM sleep or supine position; therefore sleep staging and body position are important for accurate diagnosis and estimation of AHI. -Treatment options discussed including CPAP/BiPAP machine, oral appliance, ProVent therapy, Oxy-Aid by [employer]pite[employer], new technologies, or positional sleep.Recommended use of the CPAP machine for moderate (AHI >15), moderate to severe (AHI 15-30) and severe patients (AHI > 30). Recommended weight loss which can reduce AHI especially in weight loss of greater than 5% of BMI. Positional sleep is recommended in those with low AHI, low-moderate MBI, and younger age. For severe sleep apnea, the hypoglossal nerve stimulator was recommended as well.  Problem 6A: Cardiac (AF) -recommend cardiac evaluation with Soria et al  problem 7: elevated IgE level -IgE level 166 -Candidate for Xolair -Xolair is a recombinant DNA- derived humanized IgG1K monoclonal antibody that selectively binds ot human immunoglobulin E (IgE). Xolair is produced by a Chinese hamster ovary cell suspension culture in nutrient medium containing the antibiotic gentamicin. Gentamicin is not detectable in the final product. Xolair is a sterile, white, preservative free, lyophilized powder contained in a single use vial that is reconstituted with sterile water for suspension. Side effects include: wheezing, tightness of the chest, trouble breathing, hives, skin rash, feeling anxious or light-headed, fainting, warmth or tingling under skin, or swelling of face, lips, or tongue  problem 8: low vitamin D level -low normal vitamin D -Has been associated with asthma exacerbations and increased allergic symptoms. The goal based on recent information is maintaining levels between 50-70 and low normal is 30. Recommended 50,000 units every two weeks to once a month depending on the level.  Problem 9: Dysphonia- "Old Vocal Cords" - Recommended Mouth Kote, slippery elm/slippery elm tea (throat coat)  Problem 10: Poor Balance -recommended balance therapy  problem 11 : health maintenance -s/p COVID vaccine x 5 -Covid 19 booster is not recommended at this time -suggest Prevagen OTC -recommended yearly flu shot - (refused) -recommended strep pneumonia vaccines: Prevnar-13 vaccine, followed by Pneumo vaccine 23 one year following after 65 years old. (refused)  F/P in 4 months with spi. ENT - Doris berry al. He is encouraged to call with any changes, concerns, or questions.

## 2024-07-31 ENCOUNTER — APPOINTMENT (OUTPATIENT)
Dept: UROLOGY | Facility: CLINIC | Age: 74
End: 2024-07-31
Payer: COMMERCIAL

## 2024-07-31 DIAGNOSIS — S30.812A ABRASION OF PENIS, INITIAL ENCOUNTER: ICD-10-CM

## 2024-07-31 PROCEDURE — 99203 OFFICE O/P NEW LOW 30 MIN: CPT

## 2024-07-31 RX ORDER — CLOTRIMAZOLE AND BETAMETHASONE DIPROPIONATE 10; .5 MG/G; MG/G
1-0.05 CREAM TOPICAL TWICE DAILY
Qty: 1 | Refills: 0 | Status: ACTIVE | COMMUNITY
Start: 2024-07-31 | End: 1900-01-01

## 2024-07-31 NOTE — HISTORY OF PRESENT ILLNESS
[FreeTextEntry1] : 74 year old male presents today to office with c/o color change and bleeding to penis for 3 weeks since starting three new medications. Patient reports happened prior about 10 years ago and resolved on its own.   90% improvement since it began

## 2024-07-31 NOTE — PHYSICAL EXAM
[General Appearance - Well Developed] : well developed [Normal Appearance] : normal appearance [] : no respiratory distress [Urethral Meatus] : meatus normal [Scrotum] : the scrotum was normal [Rectal Exam - Seminal Vesicles] : the seminal vesicles were normal [Epididymis] : the epididymides were normal [Testes Tenderness] : no tenderness of the testes [Prostate Size ___ gm] : prostate size [unfilled] gm [Normal Station and Gait] : the gait and station were normal for the patient's age [Skin Color & Pigmentation] : normal skin color and pigmentation [No Focal Deficits] : no focal deficits [Oriented To Time, Place, And Person] : oriented to person, place, and time [TextEntry] : chaperone offered and refused by patient  petechiae to left of penis  minimal penile adhesion

## 2024-08-10 ENCOUNTER — NON-APPOINTMENT (OUTPATIENT)
Age: 74
End: 2024-08-10

## 2024-09-16 ENCOUNTER — APPOINTMENT (OUTPATIENT)
Dept: UROLOGY | Facility: CLINIC | Age: 74
End: 2024-09-16
Payer: COMMERCIAL

## 2024-09-16 VITALS
RESPIRATION RATE: 18 BRPM | SYSTOLIC BLOOD PRESSURE: 121 MMHG | HEART RATE: 89 BPM | OXYGEN SATURATION: 94 % | WEIGHT: 235 LBS | BODY MASS INDEX: 32.9 KG/M2 | DIASTOLIC BLOOD PRESSURE: 75 MMHG | TEMPERATURE: 97.5 F | HEIGHT: 71 IN

## 2024-09-16 DIAGNOSIS — S30.812A ABRASION OF PENIS, INITIAL ENCOUNTER: ICD-10-CM

## 2024-09-16 PROCEDURE — 99212 OFFICE O/P EST SF 10 MIN: CPT

## 2024-09-16 RX ORDER — CLOTRIMAZOLE AND BETAMETHASONE DIPROPIONATE 10; .5 MG/G; MG/G
1-0.05 CREAM TOPICAL TWICE DAILY
Qty: 2 | Refills: 6 | Status: ACTIVE | COMMUNITY
Start: 2024-09-16 | End: 1900-01-01

## 2024-09-19 NOTE — HISTORY OF PRESENT ILLNESS
[FreeTextEntry1] : 74 year old male presents today to office with c/o color change and bleeding to penis for 3 weeks since starting three new medications. Patient reports happened prior about 10 years ago and resolved on its own.   90% improvement since it began    9/16/2024 pt reports he has had continued improvement  but not complete resolution

## 2024-09-19 NOTE — PHYSICAL EXAM
[General Appearance - Well Developed] : well developed [General Appearance - Well Nourished] : well nourished [Normal Appearance] : normal appearance [Normal Station and Gait] : the gait and station were normal for the patient's age [Skin Color & Pigmentation] : normal skin color and pigmentation [No Focal Deficits] : no focal deficits [Oriented To Time, Place, And Person] : oriented to person, place, and time [Chaperone Present] : A chaperone was present in the examining room during all aspects of the physical examination [de-identified] : glans left and right red/purple ordonez wiht scaring at sites of abrasion [FreeTextEntry2] : ishan wolf

## 2024-09-19 NOTE — PHYSICAL EXAM
[General Appearance - Well Developed] : well developed [General Appearance - Well Nourished] : well nourished [Normal Appearance] : normal appearance [Normal Station and Gait] : the gait and station were normal for the patient's age [Skin Color & Pigmentation] : normal skin color and pigmentation [No Focal Deficits] : no focal deficits [Oriented To Time, Place, And Person] : oriented to person, place, and time [Chaperone Present] : A chaperone was present in the examining room during all aspects of the physical examination [de-identified] : glans left and right red/purple ordonez wiht scaring at sites of abrasion [FreeTextEntry2] : ishan wolf

## 2024-10-22 ENCOUNTER — APPOINTMENT (OUTPATIENT)
Dept: PULMONOLOGY | Facility: CLINIC | Age: 74
End: 2024-10-22
Payer: COMMERCIAL

## 2024-10-22 VITALS
RESPIRATION RATE: 18 BRPM | DIASTOLIC BLOOD PRESSURE: 78 MMHG | TEMPERATURE: 96.6 F | OXYGEN SATURATION: 97 % | HEIGHT: 71 IN | SYSTOLIC BLOOD PRESSURE: 130 MMHG | HEART RATE: 62 BPM

## 2024-10-22 DIAGNOSIS — G47.33 OBSTRUCTIVE SLEEP APNEA (ADULT) (PEDIATRIC): ICD-10-CM

## 2024-10-22 DIAGNOSIS — K21.9 GASTRO-ESOPHAGEAL REFLUX DISEASE W/OUT ESOPHAGITIS: ICD-10-CM

## 2024-10-22 DIAGNOSIS — E66.9 OBESITY, UNSPECIFIED: ICD-10-CM

## 2024-10-22 DIAGNOSIS — R06.02 SHORTNESS OF BREATH: ICD-10-CM

## 2024-10-22 DIAGNOSIS — R05.3 CHRONIC COUGH: ICD-10-CM

## 2024-10-22 DIAGNOSIS — R79.89 OTHER SPECIFIED ABNORMAL FINDINGS OF BLOOD CHEMISTRY: ICD-10-CM

## 2024-10-22 DIAGNOSIS — J45.991 COUGH VARIANT ASTHMA: ICD-10-CM

## 2024-10-22 DIAGNOSIS — R76.8 OTHER SPECIFIED ABNORMAL IMMUNOLOGICAL FINDINGS IN SERUM: ICD-10-CM

## 2024-10-22 PROCEDURE — 95012 NITRIC OXIDE EXP GAS DETER: CPT

## 2024-10-22 PROCEDURE — 99214 OFFICE O/P EST MOD 30 MIN: CPT | Mod: 25

## 2024-10-22 PROCEDURE — 94010 BREATHING CAPACITY TEST: CPT

## 2024-10-22 RX ORDER — DOXEPIN 3 MG/1
3 TABLET, FILM COATED ORAL
Qty: 90 | Refills: 1 | Status: ACTIVE | COMMUNITY
Start: 2024-10-22 | End: 1900-01-01

## 2025-02-25 ENCOUNTER — APPOINTMENT (OUTPATIENT)
Dept: UROLOGY | Facility: CLINIC | Age: 75
End: 2025-02-25
Payer: COMMERCIAL

## 2025-02-25 VITALS
RESPIRATION RATE: 17 BRPM | OXYGEN SATURATION: 96 % | SYSTOLIC BLOOD PRESSURE: 124 MMHG | HEIGHT: 71 IN | TEMPERATURE: 97.3 F | DIASTOLIC BLOOD PRESSURE: 73 MMHG | BODY MASS INDEX: 32.9 KG/M2 | HEART RATE: 69 BPM | WEIGHT: 235 LBS

## 2025-02-25 DIAGNOSIS — R35.0 FREQUENCY OF MICTURITION: ICD-10-CM

## 2025-02-25 PROCEDURE — 99213 OFFICE O/P EST LOW 20 MIN: CPT

## 2025-04-15 ENCOUNTER — APPOINTMENT (OUTPATIENT)
Dept: PULMONOLOGY | Facility: CLINIC | Age: 75
End: 2025-04-15
Payer: COMMERCIAL

## 2025-04-15 VITALS
WEIGHT: 227 LBS | TEMPERATURE: 97.3 F | HEART RATE: 53 BPM | DIASTOLIC BLOOD PRESSURE: 68 MMHG | SYSTOLIC BLOOD PRESSURE: 124 MMHG | BODY MASS INDEX: 31.78 KG/M2 | OXYGEN SATURATION: 97 % | RESPIRATION RATE: 17 BRPM | HEIGHT: 71 IN

## 2025-04-15 DIAGNOSIS — J45.991 COUGH VARIANT ASTHMA: ICD-10-CM

## 2025-04-15 DIAGNOSIS — R79.89 OTHER SPECIFIED ABNORMAL FINDINGS OF BLOOD CHEMISTRY: ICD-10-CM

## 2025-04-15 DIAGNOSIS — R76.8 OTHER SPECIFIED ABNORMAL IMMUNOLOGICAL FINDINGS IN SERUM: ICD-10-CM

## 2025-04-15 DIAGNOSIS — R26.89 OTHER ABNORMALITIES OF GAIT AND MOBILITY: ICD-10-CM

## 2025-04-15 DIAGNOSIS — K21.9 GASTRO-ESOPHAGEAL REFLUX DISEASE W/OUT ESOPHAGITIS: ICD-10-CM

## 2025-04-15 DIAGNOSIS — R05.3 CHRONIC COUGH: ICD-10-CM

## 2025-04-15 DIAGNOSIS — G47.33 OBSTRUCTIVE SLEEP APNEA (ADULT) (PEDIATRIC): ICD-10-CM

## 2025-04-15 DIAGNOSIS — R06.02 SHORTNESS OF BREATH: ICD-10-CM

## 2025-04-15 PROCEDURE — 94010 BREATHING CAPACITY TEST: CPT

## 2025-04-15 PROCEDURE — 94727 GAS DIL/WSHOT DETER LNG VOL: CPT

## 2025-04-15 PROCEDURE — 94729 DIFFUSING CAPACITY: CPT

## 2025-04-15 PROCEDURE — ZZZZZ: CPT

## 2025-04-15 PROCEDURE — 99214 OFFICE O/P EST MOD 30 MIN: CPT | Mod: 25

## 2025-04-15 PROCEDURE — 95012 NITRIC OXIDE EXP GAS DETER: CPT

## 2025-09-08 ENCOUNTER — APPOINTMENT (OUTPATIENT)
Dept: UROLOGY | Facility: CLINIC | Age: 75
End: 2025-09-08
Payer: COMMERCIAL

## 2025-09-08 VITALS
OXYGEN SATURATION: 96 % | HEART RATE: 71 BPM | HEIGHT: 71 IN | RESPIRATION RATE: 16 BRPM | TEMPERATURE: 97.1 F | DIASTOLIC BLOOD PRESSURE: 69 MMHG | WEIGHT: 227 LBS | BODY MASS INDEX: 31.78 KG/M2 | SYSTOLIC BLOOD PRESSURE: 106 MMHG

## 2025-09-08 DIAGNOSIS — M62.81 MUSCLE WEAKNESS (GENERALIZED): ICD-10-CM

## 2025-09-08 DIAGNOSIS — R39.15 URGENCY OF URINATION: ICD-10-CM

## 2025-09-08 DIAGNOSIS — R35.0 FREQUENCY OF MICTURITION: ICD-10-CM

## 2025-09-08 PROCEDURE — 99214 OFFICE O/P EST MOD 30 MIN: CPT | Mod: 25

## 2025-09-08 PROCEDURE — 51798 US URINE CAPACITY MEASURE: CPT

## 2025-09-08 RX ORDER — TAMSULOSIN HYDROCHLORIDE 0.4 MG/1
0.4 CAPSULE ORAL
Qty: 90 | Refills: 3 | Status: ACTIVE | COMMUNITY
Start: 2025-09-08 | End: 1900-01-01

## 2025-09-09 LAB
APPEARANCE: CLEAR
BILIRUBIN URINE: NEGATIVE
BLOOD URINE: NEGATIVE
COLOR: YELLOW
GLUCOSE QUALITATIVE U: >=1000 MG/DL
KETONES URINE: ABNORMAL MG/DL
LEUKOCYTE ESTERASE URINE: NEGATIVE
NITRITE URINE: NEGATIVE
PH URINE: 5.5
PROTEIN URINE: NEGATIVE MG/DL
SPECIFIC GRAVITY URINE: >1.03
UROBILINOGEN URINE: 0.2 MG/DL